# Patient Record
Sex: FEMALE | Race: WHITE | NOT HISPANIC OR LATINO | Employment: FULL TIME | URBAN - METROPOLITAN AREA
[De-identification: names, ages, dates, MRNs, and addresses within clinical notes are randomized per-mention and may not be internally consistent; named-entity substitution may affect disease eponyms.]

---

## 2017-01-24 ENCOUNTER — HOSPITAL ENCOUNTER (OUTPATIENT)
Dept: CARDIOLOGY | Facility: HOSPITAL | Age: 52
Discharge: HOME | End: 2017-01-24
Attending: INTERNAL MEDICINE | Admitting: INTERNAL MEDICINE

## 2017-08-02 ENCOUNTER — HOSPITAL ENCOUNTER (OUTPATIENT)
Dept: URGENT CARE | Facility: CLINIC | Age: 52
Setting detail: SPECIMEN
Discharge: HOME | End: 2017-08-02
Attending: FAMILY MEDICINE | Admitting: FAMILY MEDICINE

## 2017-08-02 LAB
AMPICILLIN SUSC ISLT: NORMAL
AZTREONAM SUSC ISLT: NORMAL
BACTERIA ISLT: NORMAL
BACTERIA SPEC AEROBE CULT: NORMAL
CEFAZOLIN SUSC ISLT: NORMAL
CEFEPIME SUSC ISLT: NORMAL
CEFTRIAXONE SUSC ISLT: NORMAL
CIPROFLOXACIN SUSC ISLT: NORMAL
COLONY COUNT: NORMAL
ERTAPENEM SUSC ISLT: NORMAL
LEVOFLOXACIN SUSC ISLT: NORMAL
Lab: NORMAL
MEROPENEM SUSC ISLT: NORMAL
MICRO REPORT STATUS: NORMAL
NITROFURANTOIN SUSC ISLT: NORMAL
PIP+TAZO SUSC ISLT: NORMAL
SPECIMEN SOURCE: NORMAL
SUSC METH SPEC: NORMAL
TETRACYCLINE SUSC ISLT: NORMAL
TOBRAMYCIN SUSC ISLT: NORMAL
TRIMETHOPRIM/SULFA: NORMAL

## 2017-09-25 ENCOUNTER — HOSPITAL ENCOUNTER (OUTPATIENT)
Dept: PHYSICAL THERAPY | Facility: HOSPITAL | Age: 52
Setting detail: RECURRING SERIES
Discharge: HOME | End: 2017-12-26
Attending: ORTHOPAEDIC SURGERY | Admitting: ORTHOPAEDIC SURGERY

## 2018-11-29 ENCOUNTER — HOSPITAL ENCOUNTER (OUTPATIENT)
Dept: OTHER | Facility: HOSPITAL | Age: 53
Setting detail: RECURRING SERIES
Discharge: HOME | End: 2018-12-31
Attending: PHYSICIAN ASSISTANT | Admitting: PHYSICIAN ASSISTANT

## 2019-01-04 ENCOUNTER — HOSPITAL ENCOUNTER (OUTPATIENT)
Dept: OTHER | Facility: HOSPITAL | Age: 54
Setting detail: RECURRING SERIES
Discharge: HOME | End: 2019-01-28
Attending: PHYSICIAN ASSISTANT | Admitting: PHYSICIAN ASSISTANT

## 2019-04-22 ENCOUNTER — HOSPITAL ENCOUNTER (OUTPATIENT)
Dept: URGENT CARE | Facility: CLINIC | Age: 54
Setting detail: SPECIMEN
Discharge: HOME | End: 2019-04-22
Attending: FAMILY MEDICINE | Admitting: FAMILY MEDICINE

## 2019-04-22 LAB
AMPICILLIN SUSC ISLT: NORMAL
AZTREONAM SUSC ISLT: NORMAL
BACTERIA ISLT: NORMAL
BACTERIA SPEC AEROBE CULT: NORMAL
CEFAZOLIN SUSC ISLT: NORMAL
CEFEPIME SUSC ISLT: NORMAL
CEFTRIAXONE SUSC ISLT: NORMAL
CIPROFLOXACIN SUSC ISLT: NORMAL
COLONY COUNT: NORMAL
LEVOFLOXACIN SUSC ISLT: NORMAL
Lab: NORMAL
MEROPENEM SUSC ISLT: NORMAL
MICRO REPORT STATUS: NORMAL
NITROFURANTOIN SUSC ISLT: NORMAL
PIP+TAZO SUSC ISLT: NORMAL
SPECIMEN SOURCE: NORMAL
SUSC METH SPEC: NORMAL
TETRACYCLINE SUSC ISLT: NORMAL
TOBRAMYCIN SUSC ISLT: NORMAL
TRIMETHOPRIM/SULFA: NORMAL

## 2021-11-06 ENCOUNTER — LAB (OUTPATIENT)
Dept: LAB | Facility: HOSPITAL | Age: 56
End: 2021-11-06

## 2021-11-06 ENCOUNTER — TRANSCRIBE ORDERS (OUTPATIENT)
Dept: ADMINISTRATIVE | Facility: HOSPITAL | Age: 56
End: 2021-11-06

## 2021-11-06 DIAGNOSIS — I51.9 MYXEDEMA HEART DISEASE: ICD-10-CM

## 2021-11-06 DIAGNOSIS — E78.2 MIXED HYPERLIPIDEMIA: ICD-10-CM

## 2021-11-06 DIAGNOSIS — I10 ESSENTIAL HYPERTENSION: ICD-10-CM

## 2021-11-06 DIAGNOSIS — E03.9 MYXEDEMA HEART DISEASE: ICD-10-CM

## 2021-11-06 DIAGNOSIS — I10 ESSENTIAL HYPERTENSION: Primary | ICD-10-CM

## 2021-11-06 LAB
25(OH)D3 SERPL-MCNC: 29.3 NG/ML (ref 30–100)
ALBUMIN SERPL-MCNC: 4.4 G/DL (ref 3.5–5.2)
ALBUMIN/GLOB SERPL: 1.6 G/DL
ALP SERPL-CCNC: 83 U/L (ref 39–117)
ALT SERPL W P-5'-P-CCNC: 29 U/L (ref 1–33)
ANION GAP SERPL CALCULATED.3IONS-SCNC: 9.8 MMOL/L (ref 5–15)
AST SERPL-CCNC: 21 U/L (ref 1–32)
BASOPHILS # BLD MANUAL: 0.11 10*3/MM3 (ref 0–0.2)
BASOPHILS NFR BLD AUTO: 1.1 % (ref 0–1.5)
BILIRUB SERPL-MCNC: 0.4 MG/DL (ref 0–1.2)
BUN SERPL-MCNC: 18 MG/DL (ref 6–20)
BUN/CREAT SERPL: 29 (ref 7–25)
CALCIUM SPEC-SCNC: 9.7 MG/DL (ref 8.6–10.5)
CHLORIDE SERPL-SCNC: 107 MMOL/L (ref 98–107)
CHOLEST SERPL-MCNC: 157 MG/DL (ref 0–200)
CO2 SERPL-SCNC: 27.2 MMOL/L (ref 22–29)
CREAT SERPL-MCNC: 0.62 MG/DL (ref 0.57–1)
DEPRECATED RDW RBC AUTO: 41.2 FL (ref 37–54)
ERYTHROCYTE [DISTWIDTH] IN BLOOD BY AUTOMATED COUNT: 12.7 % (ref 12.3–15.4)
GFR SERPL CREATININE-BSD FRML MDRD: 99 ML/MIN/1.73
GLOBULIN UR ELPH-MCNC: 2.8 GM/DL
GLUCOSE SERPL-MCNC: 85 MG/DL (ref 65–99)
HCT VFR BLD AUTO: 43.7 % (ref 34–46.6)
HDLC SERPL-MCNC: 47 MG/DL (ref 40–60)
HGB BLD-MCNC: 14.5 G/DL (ref 12–15.9)
LDLC SERPL CALC-MCNC: 90 MG/DL (ref 0–100)
LDLC/HDLC SERPL: 1.89 {RATIO}
LYMPHOCYTES # BLD MANUAL: 3.83 10*3/MM3 (ref 0.7–3.1)
LYMPHOCYTES NFR BLD MANUAL: 38.7 % (ref 19.6–45.3)
LYMPHOCYTES NFR BLD MANUAL: 8.6 % (ref 5–12)
MCH RBC QN AUTO: 29.5 PG (ref 26.6–33)
MCHC RBC AUTO-ENTMCNC: 33.2 G/DL (ref 31.5–35.7)
MCV RBC AUTO: 89 FL (ref 79–97)
MONOCYTES # BLD AUTO: 0.85 10*3/MM3 (ref 0.1–0.9)
NEUTROPHILS # BLD AUTO: 5.1 10*3/MM3 (ref 1.7–7)
NEUTROPHILS NFR BLD MANUAL: 51.6 % (ref 42.7–76)
PLAT MORPH BLD: NORMAL
PLATELET # BLD AUTO: 336 10*3/MM3 (ref 140–450)
PMV BLD AUTO: 10.5 FL (ref 6–12)
POTASSIUM SERPL-SCNC: 5.2 MMOL/L (ref 3.5–5.2)
PROT SERPL-MCNC: 7.2 G/DL (ref 6–8.5)
RBC # BLD AUTO: 4.91 10*6/MM3 (ref 3.77–5.28)
RBC MORPH BLD: NORMAL
SMUDGE CELLS BLD QL SMEAR: ABNORMAL
SODIUM SERPL-SCNC: 144 MMOL/L (ref 136–145)
TRIGL SERPL-MCNC: 107 MG/DL (ref 0–150)
TSH SERPL DL<=0.05 MIU/L-ACNC: 1.13 UIU/ML (ref 0.27–4.2)
VLDLC SERPL-MCNC: 20 MG/DL (ref 5–40)
WBC # BLD AUTO: 9.89 10*3/MM3 (ref 3.4–10.8)

## 2021-11-06 PROCEDURE — 84443 ASSAY THYROID STIM HORMONE: CPT

## 2021-11-06 PROCEDURE — 80053 COMPREHEN METABOLIC PANEL: CPT

## 2021-11-06 PROCEDURE — 82306 VITAMIN D 25 HYDROXY: CPT

## 2021-11-06 PROCEDURE — 36415 COLL VENOUS BLD VENIPUNCTURE: CPT

## 2021-11-06 PROCEDURE — 85025 COMPLETE CBC W/AUTO DIFF WBC: CPT

## 2021-11-06 PROCEDURE — 80061 LIPID PANEL: CPT

## 2021-11-06 PROCEDURE — 85007 BL SMEAR W/DIFF WBC COUNT: CPT

## 2021-12-17 ENCOUNTER — APPOINTMENT (OUTPATIENT)
Dept: GENERAL RADIOLOGY | Facility: HOSPITAL | Age: 56
End: 2021-12-17

## 2021-12-17 ENCOUNTER — HOSPITAL ENCOUNTER (OUTPATIENT)
Facility: HOSPITAL | Age: 56
Setting detail: OBSERVATION
Discharge: HOME | End: 2021-12-18
Attending: EMERGENCY MEDICINE | Admitting: INTERNAL MEDICINE

## 2021-12-17 DIAGNOSIS — R07.9 CHEST PAIN, UNSPECIFIED TYPE: Primary | ICD-10-CM

## 2021-12-17 DIAGNOSIS — I10 UNCONTROLLED HYPERTENSION: ICD-10-CM

## 2021-12-17 LAB
ALBUMIN SERPL-MCNC: 4.4 G/DL (ref 3.5–5.2)
ALBUMIN/GLOB SERPL: 1.6 G/DL
ALP SERPL-CCNC: 93 U/L (ref 39–117)
ALT SERPL W P-5'-P-CCNC: 29 U/L (ref 1–33)
ANION GAP SERPL CALCULATED.3IONS-SCNC: 11 MMOL/L (ref 5–15)
AST SERPL-CCNC: 17 U/L (ref 1–32)
BASOPHILS # BLD AUTO: 0.1 10*3/MM3 (ref 0–0.2)
BASOPHILS NFR BLD AUTO: 0.8 % (ref 0–1.5)
BILIRUB SERPL-MCNC: 0.2 MG/DL (ref 0–1.2)
BUN SERPL-MCNC: 20 MG/DL (ref 6–20)
BUN/CREAT SERPL: 29.9 (ref 7–25)
CALCIUM SPEC-SCNC: 9 MG/DL (ref 8.6–10.5)
CHLORIDE SERPL-SCNC: 101 MMOL/L (ref 98–107)
CO2 SERPL-SCNC: 26 MMOL/L (ref 22–29)
CREAT SERPL-MCNC: 0.67 MG/DL (ref 0.57–1)
DEPRECATED RDW RBC AUTO: 42.9 FL (ref 37–54)
EOSINOPHIL # BLD AUTO: 0.1 10*3/MM3 (ref 0–0.4)
EOSINOPHIL NFR BLD AUTO: 0.5 % (ref 0.3–6.2)
ERYTHROCYTE [DISTWIDTH] IN BLOOD BY AUTOMATED COUNT: 13.8 % (ref 12.3–15.4)
GFR SERPL CREATININE-BSD FRML MDRD: 90 ML/MIN/1.73
GLOBULIN UR ELPH-MCNC: 2.7 GM/DL
GLUCOSE SERPL-MCNC: 90 MG/DL (ref 65–99)
HCT VFR BLD AUTO: 42.7 % (ref 34–46.6)
HGB BLD-MCNC: 14.1 G/DL (ref 12–15.9)
LIPASE SERPL-CCNC: 36 U/L (ref 13–60)
LYMPHOCYTES # BLD AUTO: 4.8 10*3/MM3 (ref 0.7–3.1)
LYMPHOCYTES NFR BLD AUTO: 40.6 % (ref 19.6–45.3)
MAGNESIUM SERPL-MCNC: 2 MG/DL (ref 1.6–2.6)
MCH RBC QN AUTO: 29 PG (ref 26.6–33)
MCHC RBC AUTO-ENTMCNC: 33 G/DL (ref 31.5–35.7)
MCV RBC AUTO: 87.9 FL (ref 79–97)
MONOCYTES # BLD AUTO: 0.5 10*3/MM3 (ref 0.1–0.9)
MONOCYTES NFR BLD AUTO: 4.5 % (ref 5–12)
NEUTROPHILS NFR BLD AUTO: 53.6 % (ref 42.7–76)
NEUTROPHILS NFR BLD AUTO: 6.3 10*3/MM3 (ref 1.7–7)
NRBC BLD AUTO-RTO: 0.3 /100 WBC (ref 0–0.2)
PLATELET # BLD AUTO: 291 10*3/MM3 (ref 140–450)
PMV BLD AUTO: 8.2 FL (ref 6–12)
POTASSIUM SERPL-SCNC: 4.1 MMOL/L (ref 3.5–5.2)
PROT SERPL-MCNC: 7.1 G/DL (ref 6–8.5)
RBC # BLD AUTO: 4.86 10*6/MM3 (ref 3.77–5.28)
SARS-COV-2 RNA PNL SPEC NAA+PROBE: NOT DETECTED
SODIUM SERPL-SCNC: 138 MMOL/L (ref 136–145)
TROPONIN T SERPL-MCNC: <0.01 NG/ML (ref 0–0.03)
TROPONIN T SERPL-MCNC: <0.01 NG/ML (ref 0–0.03)
TSH SERPL DL<=0.05 MIU/L-ACNC: 0.64 UIU/ML (ref 0.27–4.2)
WBC NRBC COR # BLD: 11.8 10*3/MM3 (ref 3.4–10.8)
WHOLE BLOOD HOLD SPECIMEN: NORMAL

## 2021-12-17 PROCEDURE — 93005 ELECTROCARDIOGRAM TRACING: CPT

## 2021-12-17 PROCEDURE — C9803 HOPD COVID-19 SPEC COLLECT: HCPCS

## 2021-12-17 PROCEDURE — 93005 ELECTROCARDIOGRAM TRACING: CPT | Performed by: EMERGENCY MEDICINE

## 2021-12-17 PROCEDURE — 99284 EMERGENCY DEPT VISIT MOD MDM: CPT

## 2021-12-17 PROCEDURE — G0378 HOSPITAL OBSERVATION PER HR: HCPCS

## 2021-12-17 PROCEDURE — 85025 COMPLETE CBC W/AUTO DIFF WBC: CPT | Performed by: EMERGENCY MEDICINE

## 2021-12-17 PROCEDURE — 84443 ASSAY THYROID STIM HORMONE: CPT | Performed by: EMERGENCY MEDICINE

## 2021-12-17 PROCEDURE — 99204 OFFICE O/P NEW MOD 45 MIN: CPT | Performed by: INTERNAL MEDICINE

## 2021-12-17 PROCEDURE — 71045 X-RAY EXAM CHEST 1 VIEW: CPT

## 2021-12-17 PROCEDURE — U0003 INFECTIOUS AGENT DETECTION BY NUCLEIC ACID (DNA OR RNA); SEVERE ACUTE RESPIRATORY SYNDROME CORONAVIRUS 2 (SARS-COV-2) (CORONAVIRUS DISEASE [COVID-19]), AMPLIFIED PROBE TECHNIQUE, MAKING USE OF HIGH THROUGHPUT TECHNOLOGIES AS DESCRIBED BY CMS-2020-01-R: HCPCS | Performed by: EMERGENCY MEDICINE

## 2021-12-17 PROCEDURE — 83690 ASSAY OF LIPASE: CPT | Performed by: EMERGENCY MEDICINE

## 2021-12-17 PROCEDURE — 80053 COMPREHEN METABOLIC PANEL: CPT | Performed by: EMERGENCY MEDICINE

## 2021-12-17 PROCEDURE — 83735 ASSAY OF MAGNESIUM: CPT | Performed by: EMERGENCY MEDICINE

## 2021-12-17 PROCEDURE — 84484 ASSAY OF TROPONIN QUANT: CPT | Performed by: INTERNAL MEDICINE

## 2021-12-17 PROCEDURE — 84484 ASSAY OF TROPONIN QUANT: CPT | Performed by: EMERGENCY MEDICINE

## 2021-12-17 RX ORDER — ESCITALOPRAM OXALATE 10 MG/1
20 TABLET ORAL NIGHTLY
Status: DISCONTINUED | OUTPATIENT
Start: 2021-12-17 | End: 2021-12-18 | Stop reason: HOSPADM

## 2021-12-17 RX ORDER — LEVOTHYROXINE SODIUM 0.15 MG/1
150 TABLET ORAL
Status: DISCONTINUED | OUTPATIENT
Start: 2021-12-18 | End: 2021-12-18 | Stop reason: HOSPADM

## 2021-12-17 RX ORDER — SODIUM CHLORIDE 0.9 % (FLUSH) 0.9 %
3 SYRINGE (ML) INJECTION EVERY 12 HOURS SCHEDULED
Status: DISCONTINUED | OUTPATIENT
Start: 2021-12-17 | End: 2021-12-18 | Stop reason: HOSPADM

## 2021-12-17 RX ORDER — PANTOPRAZOLE SODIUM 40 MG/1
40 TABLET, DELAYED RELEASE ORAL NIGHTLY
Status: DISCONTINUED | OUTPATIENT
Start: 2021-12-17 | End: 2021-12-18 | Stop reason: HOSPADM

## 2021-12-17 RX ORDER — CHOLECALCIFEROL (VITAMIN D3) 125 MCG
5 CAPSULE ORAL NIGHTLY PRN
Status: DISCONTINUED | OUTPATIENT
Start: 2021-12-17 | End: 2021-12-18 | Stop reason: HOSPADM

## 2021-12-17 RX ORDER — ACETAMINOPHEN 160 MG/5ML
650 SOLUTION ORAL EVERY 4 HOURS PRN
Status: DISCONTINUED | OUTPATIENT
Start: 2021-12-17 | End: 2021-12-18 | Stop reason: HOSPADM

## 2021-12-17 RX ORDER — SODIUM CHLORIDE 0.9 % (FLUSH) 0.9 %
10 SYRINGE (ML) INJECTION AS NEEDED
Status: DISCONTINUED | OUTPATIENT
Start: 2021-12-17 | End: 2021-12-18 | Stop reason: HOSPADM

## 2021-12-17 RX ORDER — ASPIRIN 325 MG
325 TABLET ORAL ONCE
Status: COMPLETED | OUTPATIENT
Start: 2021-12-17 | End: 2021-12-17

## 2021-12-17 RX ORDER — NITROGLYCERIN 0.4 MG/1
0.4 TABLET SUBLINGUAL
Status: DISCONTINUED | OUTPATIENT
Start: 2021-12-17 | End: 2021-12-18 | Stop reason: HOSPADM

## 2021-12-17 RX ORDER — ZOLPIDEM TARTRATE 5 MG/1
5 TABLET ORAL NIGHTLY PRN
Status: DISCONTINUED | OUTPATIENT
Start: 2021-12-17 | End: 2021-12-18 | Stop reason: HOSPADM

## 2021-12-17 RX ORDER — ONDANSETRON 2 MG/ML
4 INJECTION INTRAMUSCULAR; INTRAVENOUS EVERY 6 HOURS PRN
Status: DISCONTINUED | OUTPATIENT
Start: 2021-12-17 | End: 2021-12-18 | Stop reason: HOSPADM

## 2021-12-17 RX ORDER — LOSARTAN POTASSIUM 25 MG/1
25 TABLET ORAL
Status: DISCONTINUED | OUTPATIENT
Start: 2021-12-18 | End: 2021-12-18 | Stop reason: HOSPADM

## 2021-12-17 RX ORDER — SODIUM CHLORIDE 0.9 % (FLUSH) 0.9 %
3-10 SYRINGE (ML) INJECTION AS NEEDED
Status: DISCONTINUED | OUTPATIENT
Start: 2021-12-17 | End: 2021-12-18 | Stop reason: HOSPADM

## 2021-12-17 RX ORDER — HYDRALAZINE HYDROCHLORIDE 20 MG/ML
10 INJECTION INTRAMUSCULAR; INTRAVENOUS EVERY 6 HOURS PRN
Status: DISCONTINUED | OUTPATIENT
Start: 2021-12-17 | End: 2021-12-18 | Stop reason: HOSPADM

## 2021-12-17 RX ADMIN — PANTOPRAZOLE SODIUM 40 MG: 40 TABLET, DELAYED RELEASE ORAL at 21:40

## 2021-12-17 RX ADMIN — ESCITALOPRAM OXALATE 20 MG: 10 TABLET ORAL at 21:40

## 2021-12-17 RX ADMIN — ZOLPIDEM TARTRATE 5 MG: 5 TABLET ORAL at 21:40

## 2021-12-17 RX ADMIN — ASPIRIN 325 MG ORAL TABLET 325 MG: 325 PILL ORAL at 13:00

## 2021-12-17 RX ADMIN — SODIUM CHLORIDE, PRESERVATIVE FREE 3 ML: 5 INJECTION INTRAVENOUS at 21:40

## 2021-12-17 NOTE — PLAN OF CARE
Problem: Adult Inpatient Plan of Care  Goal: Plan of Care Review  12/17/2021 1735 by Aziza Bolton, JOEL  Outcome: Ongoing, Progressing  Flowsheets (Taken 12/17/2021 1735)  Progress: no change  Plan of Care Reviewed With: patient  Outcome Summary: Pt admitted from ER with chest pain.  Resting in bed, V/S stable.  Myoview in the morning.  12/17/2021 1735 by Aziza Bolton, RN  Outcome: Ongoing, Progressing  Flowsheets (Taken 12/17/2021 1735)  Progress: no change  Plan of Care Reviewed With: patient  Outcome Summary: Pt admitted from ER with chest pain.  Resting in bed, V/S stable.  Myoview in the morning.  12/17/2021 1734 by Aziza Bolton, JOEL  Outcome: Ongoing, Progressing   Goal Outcome Evaluation:

## 2021-12-17 NOTE — ED PROVIDER NOTES
Subjective   History of Present Illness  Chest pain  56-year-old female describes a right-sided and substernal chest pain described as sharp in character variable intensity, off and on over the last 2 weeks with variable duration generally around 5 to 10 minutes.  She reports no relieving or exacerbating factors.  States it often comes on at rest.  She reports no cough or fever.  She has had nausea.  She has been lightheaded at times and near syncopal and describes increased fatigue.  Review of Systems   Constitutional: Positive for fatigue.   HENT: Negative.    Eyes: Negative.    Respiratory: Negative.    Cardiovascular: Positive for chest pain.   Gastrointestinal: Positive for nausea.   Genitourinary: Negative.    Musculoskeletal: Negative.    Skin: Negative.    Neurological: Positive for light-headedness.   Psychiatric/Behavioral: Negative.        Past Medical History:   Diagnosis Date   • Anxiety    • Disease of thyroid gland    • GERD (gastroesophageal reflux disease)    • Hyperlipidemia    • Hypertension    • Insomnia        Allergies   Allergen Reactions   • Nickel Hives   • Latex Nausea Only   • Ciprofloxacin Itching and Rash       Past Surgical History:   Procedure Laterality Date   • APPENDECTOMY     •  SECTION     • CYST REMOVAL     • FOOT ARTHROPLASTY     • HYSTERECTOMY         No family history on file.    Social History     Socioeconomic History   • Marital status:    Tobacco Use   • Smoking status: Never Smoker   • Smokeless tobacco: Never Used   Vaping Use   • Vaping Use: Never used     Prior to Admission medications    Medication Sig Start Date End Date Taking? Authorizing Provider   diclofenac (VOLTAREN) 75 MG EC tablet  21   Emergency, Nurse Anitha RN   diclofenac (VOLTAREN) 75 MG EC tablet diclofenac sodium 75 mg tablet,delayed release   TAKE ONE (1) TABLET BY MOUTH DAILY AS NEEDED    Emergency, Nurse Anitha RN   escitalopram (LEXAPRO) 20 MG tablet TAKE ONE (1) TABLET BY MOUTH  "EVERY DAY 10/25/21   Emergency, Nurse Epic, RN   eszopiclone (LUNESTA) 2 MG tablet TAKE ONE (1) TABLET BY MOUTH EVERY NIGHT AT BEDTIME 11/3/21   Emergency, Nurse JOEL Welsh   levothyroxine (SYNTHROID, LEVOTHROID) 150 MCG tablet levothyroxine 150 mcg tablet   TAKE ONE (1) TABLET BY MOUTH EVERY DAY    Emergency, Nurse JOEL Welsh   losartan (COZAAR) 25 MG tablet losartan 25 mg tablet   TAKE ONE (1) TABLET BY MOUTH EVERY DAY    Emergency, Nurse Epic, RN   Multiple Vitamin (multivitamin) capsule MULTIVITAMINS CAPS 8/21/16   Emergency, Nurse JOEL Welsh   pantoprazole (PROTONIX) 40 MG EC tablet TAKE ONE (1) TABLET BY MOUTH EVERY DAY 10/25/21   Emergency, Nurse JOEL Welsh   simvastatin (ZOCOR) 40 MG tablet simvastatin 40 mg tablet   TAKE ONE (1) TABLET BY MOUTH EVERY DAY 6/6/12   Emergency, Nurse Epic, RN   vitamin D3 125 MCG (5000 UT) capsule capsule Take 5,000 Units by mouth Daily.    Emergency, Nurse JOEL Wlesh     BP (!) 207/73   Pulse 67   Temp 98 °F (36.7 °C) (Temporal)   Resp 20   Ht 172.7 cm (68\")   Wt 105 kg (231 lb 7.7 oz)   SpO2 98%   BMI 35.20 kg/m²   I examined the patient using the appropriate personal protective equipment.          Objective   Physical Exam  General: Obese female, well-appearing, no acute distress, alert and appropriate  Eyes:  sclera nonicteric  HEENT: Mucous membranes moist, no mucosal swelling  Neck: Supple, no nuchal rigidity, no soft tissue swelling  Respirations: Respirations nonlabored, equal breath sounds bilaterally, clear lungs  Heart regular rate and rhythm, no murmurs rubs or gallops,   Abdomen soft nontender nondistended, no hepatosplenomegaly,  Extremities no clubbing cyanosis or edema, calves are symmetric and nontender  Neuro cranial nerves grossly intact, no focal limb deficits  Psych oriented, pleasant affect  Skin no rash, brisk cap refill  Procedures           ED Course      My EKG interpretation sinus rhythm, rate of 75, delayed R wave progression in the anterior leads   "   Results for orders placed or performed during the hospital encounter of 12/17/21   COVID-19,CEPHEID/CHARAN,COR/RYAN/PAD/POPPY IN-HOUSE(OR EMERGENT/ADD-ON),NP SWAB IN TRANSPORT MEDIA 3-4 HR TAT, RT-PCR - Swab, Nasopharynx    Specimen: Nasopharynx; Swab   Result Value Ref Range    COVID19 Not Detected Not Detected - Ref. Range   Comprehensive Metabolic Panel    Specimen: Blood   Result Value Ref Range    Glucose 90 65 - 99 mg/dL    BUN 20 6 - 20 mg/dL    Creatinine 0.67 0.57 - 1.00 mg/dL    Sodium 138 136 - 145 mmol/L    Potassium 4.1 3.5 - 5.2 mmol/L    Chloride 101 98 - 107 mmol/L    CO2 26.0 22.0 - 29.0 mmol/L    Calcium 9.0 8.6 - 10.5 mg/dL    Total Protein 7.1 6.0 - 8.5 g/dL    Albumin 4.40 3.50 - 5.20 g/dL    ALT (SGPT) 29 1 - 33 U/L    AST (SGOT) 17 1 - 32 U/L    Alkaline Phosphatase 93 39 - 117 U/L    Total Bilirubin 0.2 0.0 - 1.2 mg/dL    eGFR Non African Amer 90 >60 mL/min/1.73    Globulin 2.7 gm/dL    A/G Ratio 1.6 g/dL    BUN/Creatinine Ratio 29.9 (H) 7.0 - 25.0    Anion Gap 11.0 5.0 - 15.0 mmol/L   Lipase    Specimen: Blood   Result Value Ref Range    Lipase 36 13 - 60 U/L   Troponin    Specimen: Blood   Result Value Ref Range    Troponin T <0.010 0.000 - 0.030 ng/mL   TSH    Specimen: Blood   Result Value Ref Range    TSH 0.644 0.270 - 4.200 uIU/mL   Magnesium    Specimen: Blood   Result Value Ref Range    Magnesium 2.0 1.6 - 2.6 mg/dL   CBC Auto Differential    Specimen: Blood   Result Value Ref Range    WBC 11.80 (H) 3.40 - 10.80 10*3/mm3    RBC 4.86 3.77 - 5.28 10*6/mm3    Hemoglobin 14.1 12.0 - 15.9 g/dL    Hematocrit 42.7 34.0 - 46.6 %    MCV 87.9 79.0 - 97.0 fL    MCH 29.0 26.6 - 33.0 pg    MCHC 33.0 31.5 - 35.7 g/dL    RDW 13.8 12.3 - 15.4 %    RDW-SD 42.9 37.0 - 54.0 fl    MPV 8.2 6.0 - 12.0 fL    Platelets 291 140 - 450 10*3/mm3    Neutrophil % 53.6 42.7 - 76.0 %    Lymphocyte % 40.6 19.6 - 45.3 %    Monocyte % 4.5 (L) 5.0 - 12.0 %    Eosinophil % 0.5 0.3 - 6.2 %    Basophil % 0.8 0.0 - 1.5 %     Neutrophils, Absolute 6.30 1.70 - 7.00 10*3/mm3    Lymphocytes, Absolute 4.80 (H) 0.70 - 3.10 10*3/mm3    Monocytes, Absolute 0.50 0.10 - 0.90 10*3/mm3    Eosinophils, Absolute 0.10 0.00 - 0.40 10*3/mm3    Basophils, Absolute 0.10 0.00 - 0.20 10*3/mm3    nRBC 0.3 (H) 0.0 - 0.2 /100 WBC   ECG 12 Lead   Result Value Ref Range    QT Interval 373 ms     XR Chest 1 View    Result Date: 12/17/2021  No acute chest finding.  Electronically Signed By-Karol Woodson MD On:12/17/2021 1:04 PM This report was finalized on [id number] by  Karol Woodson MD.                                          MDM  Patient presents with atypical pain right chest intermittent over the last couple of weeks.  Her EKG shows no acute ischemic change and her initial troponin is normal.  She is not describing symptoms of DVT or dissection or pneumonia.  She did have some elevated blood pressures initially that were improved.  She was ordered aspirin and nitroglycerin.  She was stable on the monitor and resting comfortably on reexamination.  Patient does have moderate risk heart score and 3 seconds was paged for admission for further chest pain evaluation.  Final diagnoses:   Chest pain, unspecified type   Uncontrolled hypertension       ED Disposition  ED Disposition     ED Disposition Condition Comment    Decision to Admit  Level of Care: Telemetry [5]   Admitting Physician: SD RAND [[id number]]            No follow-up provider specified.       Medication List      No changes were made to your prescriptions during this visit.          Antoine Segura MD  12/17/21 5529

## 2021-12-17 NOTE — CONSULTS
"  Referring Provider: Carlos Jordan MD  Reason for Consultation:  Chest pain    Patient Care Team:  Monika Griffin APRN as PCP - General    Chief complaint  Chest pain    Subjective .     History of present illness:  Tatyana Moseley is a 56 y.o. 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  Patient has been having these symptoms off and on for last couple of weeks.  Patient has significant fatigue.  Not necessarily exertional.  Not related to any specific food or eating.  Denies having any fever cough chills nausea vomiting.  Patient had \"broken heart syndrome several years ago and did not have any significant obstructive disease at that time.  Patient does not smoke.  Patient came to the emergency room.  EKG showed no acute changes.  Troponin levels are negative.  Cardiology consultation was requested.     ROS      Patient is not having any shortness of breath, palpitations, dizziness or syncope.  Denies having any headache ,abdominal pain ,nausea, vomiting , diarrhea constipation, loss of weight or loss of appetite.  Denies having any excessive bruising ,hematuria or blood in the stool.    Review of all systems negative except as indicated      History  Past Medical History:   Diagnosis Date   • Anxiety    • Disease of thyroid gland    • GERD (gastroesophageal reflux disease)    • Hyperlipidemia    • Hypertension    • Insomnia        Past Surgical History:   Procedure Laterality Date   • APPENDECTOMY     •  SECTION     • CYST REMOVAL     • FOOT ARTHROPLASTY     • HYSTERECTOMY         No family history on file.    Social History     Tobacco Use   • Smoking status: Never Smoker   • Smokeless tobacco: Never Used   Vaping Use   • Vaping Use: Never used   Substance Use Topics   • Alcohol use: Not on file     Comment: rarely   • Drug use: Not on file      " "  Medications Prior to Admission   Medication Sig Dispense Refill Last Dose   • diclofenac (VOLTAREN) 75 MG EC tablet       • diclofenac (VOLTAREN) 75 MG EC tablet diclofenac sodium 75 mg tablet,delayed release   TAKE ONE (1) TABLET BY MOUTH DAILY AS NEEDED      • escitalopram (LEXAPRO) 20 MG tablet TAKE ONE (1) TABLET BY MOUTH EVERY DAY      • eszopiclone (LUNESTA) 2 MG tablet TAKE ONE (1) TABLET BY MOUTH EVERY NIGHT AT BEDTIME      • levothyroxine (SYNTHROID, LEVOTHROID) 150 MCG tablet levothyroxine 150 mcg tablet   TAKE ONE (1) TABLET BY MOUTH EVERY DAY      • losartan (COZAAR) 25 MG tablet losartan 25 mg tablet   TAKE ONE (1) TABLET BY MOUTH EVERY DAY      • Multiple Vitamin (multivitamin) capsule MULTIVITAMINS CAPS      • pantoprazole (PROTONIX) 40 MG EC tablet TAKE ONE (1) TABLET BY MOUTH EVERY DAY      • simvastatin (ZOCOR) 40 MG tablet simvastatin 40 mg tablet   TAKE ONE (1) TABLET BY MOUTH EVERY DAY      • vitamin D3 125 MCG (5000 UT) capsule capsule Take 5,000 Units by mouth Daily.            Nickel, Latex, and Ciprofloxacin    Scheduled Meds:escitalopram, 20 mg, Oral, Daily  [START ON 12/18/2021] levothyroxine, 150 mcg, Oral, Q AM  [START ON 12/18/2021] losartan, 25 mg, Oral, Q24H  [START ON 12/18/2021] pantoprazole, 40 mg, Oral, Q AM  sodium chloride, 3 mL, Intravenous, Q12H      Continuous Infusions:   PRN Meds:.•  acetaminophen  •  hydrALAZINE  •  melatonin  •  nitroglycerin  •  ondansetron  •  [COMPLETED] Insert peripheral IV **AND** sodium chloride  •  sodium chloride  •  zolpidem    Objective     VITAL SIGNS  Vitals:    12/17/21 1214 12/17/21 1215 12/17/21 1404 12/17/21 1513   BP:  (!) 207/73 151/80 148/76   BP Location:    Right arm   Patient Position:    Lying   Pulse: 67  64 57   Resp: 20 18 18   Temp: 98 °F (36.7 °C)      TempSrc: Temporal      SpO2: 98%  98% 92%   Weight: 105 kg (231 lb 7.7 oz)      Height: 172.7 cm (68\")          Flowsheet Rows      First Filed Value   Admission Height 172.7 " "cm (68\") Documented at 12/17/2021 1214   Admission Weight 105 kg (231 lb 7.7 oz) Documented at 12/17/2021 1214          No intake or output data in the 24 hours ending 12/17/21 1714     TELEMETRY: Sinus rhythm    Physical Exam:  The patient is alert, oriented and in no distress.  Vital signs as noted above.  Exogenous obesity (BMI 35)  Head and neck revealed no carotid bruits or jugular venous distention.  No thyromegaly or lymph adenopathy is present  Lungs clear.  No wheezing.  Breath sounds are normal bilaterally.  Heart normal first and second heart sounds.No murmur.  No precordial rub is present.  No gallop is present.  Abdomen soft and nontender.  No organomegaly is present.  Extremities with good peripheral pulses without any pedal edema.  Skin warm and dry.  Musculoskeletal system is grossly normal  CNS grossly normal      Results Review:   I reviewed the patient's new clinical results.  Lab Results (last 24 hours)     Procedure Component Value Units Date/Time    Troponin [[id number]]  (Normal) Collected: 12/17/21 1249    Specimen: Blood Updated: 12/17/21 1612     Troponin T <0.010 ng/mL     Narrative:      Troponin T Reference Range:  <= 0.03 ng/mL-   Negative for AMI  >0.03 ng/mL-     Abnormal for myocardial necrosis.  Clinicians would have to utilize clinical acumen, EKG, Troponin and serial changes to determine if it is an Acute Myocardial Infarction or myocardial injury due to an underlying chronic condition.       Results may be falsely decreased if patient taking Biotin.      Extra Tubes [[id number]] Collected: 12/17/21 1249    Specimen: Blood Updated: 12/17/21 1400    Narrative:      The following orders were created for panel order Extra Tubes.  Procedure                               Abnormality         Status                     ---------                               -----------         ------                     Gold Top - [facility][[id number]]                                   Final result             "   Light Blue Top[[id number]]                                   Final result                 Please view results for these tests on the individual orders.    Light Blue Top [[id number]] Collected: 12/17/21 1249    Specimen: Blood Updated: 12/17/21 1400     Extra Tube hold for add-on     Comment: Auto resulted       COVID PRE-OP / PRE-PROCEDURE SCREENING ORDER (NO ISOLATION) - Swab, Nasopharynx [[id number]]  (Normal) Collected: 12/17/21 1249    Specimen: Swab from Nasopharynx Updated: 12/17/21 1331    Narrative:      The following orders were created for panel order COVID PRE-OP / PRE-PROCEDURE SCREENING ORDER (NO ISOLATION) - Swab, Nasopharynx.  Procedure                               Abnormality         Status                     ---------                               -----------         ------                     COVID-19,CEPHEID/CHARAN,CO...[[id number]]  Normal              Final result                 Please view results for these tests on the individual orders.    COVID-19,CEPHEID/CHARAN,COR/RYAN/PAD/POPPY IN-HOUSE(OR EMERGENT/ADD-ON),NP SWAB IN TRANSPORT MEDIA 3-4 HR TAT, RT-PCR - Swab, Nasopharynx [[id number]]  (Normal) Collected: 12/17/21 1249    Specimen: Swab from Nasopharynx Updated: 12/17/21 1331     COVID19 Not Detected    Narrative:      Fact sheet for providers: https://www.fda.gov/media/475135/download     Fact sheet for patients: https://www.fda.gov/media/556790/download  Fact sheet for providers: https://www.fda.gov/media/303491/download     Fact sheet for patients: https://www.fda.gov/media/857734/download    TSH [[id number]]  (Normal) Collected: 12/17/21 1249    Specimen: Blood Updated: 12/17/21 1331     TSH 0.644 uIU/mL     Troponin [[id number]]  (Normal) Collected: 12/17/21 1249    Specimen: Blood Updated: 12/17/21 1331     Troponin T <0.010 ng/mL     Narrative:      Troponin T Reference Range:  <= 0.03 ng/mL-   Negative for AMI  >0.03 ng/mL-     Abnormal for myocardial necrosis.  Clinicians would have to  utilize clinical acumen, EKG, Troponin and serial changes to determine if it is an Acute Myocardial Infarction or myocardial injury due to an underlying chronic condition.       Results may be falsely decreased if patient taking Biotin.      Comprehensive Metabolic Panel [[id number]]  (Abnormal) Collected: 12/17/21 1249    Specimen: Blood Updated: 12/17/21 1327     Glucose 90 mg/dL      BUN 20 mg/dL      Creatinine 0.67 mg/dL      Sodium 138 mmol/L      Potassium 4.1 mmol/L      Chloride 101 mmol/L      CO2 26.0 mmol/L      Calcium 9.0 mg/dL      Total Protein 7.1 g/dL      Albumin 4.40 g/dL      ALT (SGPT) 29 U/L      AST (SGOT) 17 U/L      Alkaline Phosphatase 93 U/L      Total Bilirubin 0.2 mg/dL      eGFR Non African Amer 90 mL/min/1.73      Globulin 2.7 gm/dL      A/G Ratio 1.6 g/dL      BUN/Creatinine Ratio 29.9     Anion Gap 11.0 mmol/L     Narrative:      GFR Normal >60  Chronic Kidney Disease <60  Kidney Failure <15      Lipase [[id number]]  (Normal) Collected: 12/17/21 1249    Specimen: Blood Updated: 12/17/21 1327     Lipase 36 U/L     Magnesium [[id number]]  (Normal) Collected: 12/17/21 1249    Specimen: Blood Updated: 12/17/21 1327     Magnesium 2.0 mg/dL     Gold Top - SST [[id number]] Collected: 12/17/21 1249    Specimen: Blood Updated: 12/17/21 1313    CBC & Differential [[id number]]  (Abnormal) Collected: 12/17/21 1249    Specimen: Blood Updated: 12/17/21 1259    Narrative:      The following orders were created for panel order CBC & Differential.  Procedure                               Abnormality         Status                     ---------                               -----------         ------                     CBC Auto Differential[[id number]]        Abnormal            Final result                 Please view results for these tests on the individual orders.    CBC Auto Differential [[id number]]  (Abnormal) Collected: 12/17/21 1249    Specimen: Blood Updated: 12/17/21 1259     WBC 11.80 10*3/mm3       RBC 4.86 10*6/mm3      Hemoglobin 14.1 g/dL      Hematocrit 42.7 %      MCV 87.9 fL      MCH 29.0 pg      MCHC 33.0 g/dL      RDW 13.8 %      RDW-SD 42.9 fl      MPV 8.2 fL      Platelets 291 10*3/mm3      Neutrophil % 53.6 %      Lymphocyte % 40.6 %      Monocyte % 4.5 %      Eosinophil % 0.5 %      Basophil % 0.8 %      Neutrophils, Absolute 6.30 10*3/mm3      Lymphocytes, Absolute 4.80 10*3/mm3      Monocytes, Absolute 0.50 10*3/mm3      Eosinophils, Absolute 0.10 10*3/mm3      Basophils, Absolute 0.10 10*3/mm3      nRBC 0.3 /100 WBC           Imaging Results (Last 24 Hours)     Procedure Component Value Units Date/Time    XR Chest 1 View [[id number]] Collected: 12/17/21 1304     Updated: 12/17/21 1306    Narrative:      DATE OF EXAM:  12/17/2021 12:58 PM     PROCEDURE:  XR CHEST 1 VW-     INDICATIONS:  Chest pain.       COMPARISON:  None     TECHNIQUE:   Single radiographic view of the chest was obtained.     FINDINGS:  Clear lungs. Benign calcified granuloma in the periphery of the right  midlung. Normal heart size. No pleural effusion. No pneumothorax. No  acute osseous abnormality.       Impression:      No acute chest finding.     Electronically Signed By-Karol Woodson MD On:12/17/2021 1:04 PM  This report was finalized on [id number] by  Karol Woodson MD.      LAB RESULTS (LAST 7 DAYS)    CBC  Results from last 7 days   Lab Units 12/17/21  1249   WBC 10*3/mm3 11.80*   RBC 10*6/mm3 4.86   HEMOGLOBIN g/dL 14.1   HEMATOCRIT % 42.7   MCV fL 87.9   PLATELETS 10*3/mm3 291       BMP  Results from last 7 days   Lab Units 12/17/21  1249   SODIUM mmol/L 138   POTASSIUM mmol/L 4.1   CHLORIDE mmol/L 101   CO2 mmol/L 26.0   BUN mg/dL 20   CREATININE mg/dL 0.67   GLUCOSE mg/dL 90   MAGNESIUM mg/dL 2.0       CMP   Results from last 7 days   Lab Units 12/17/21  1249   SODIUM mmol/L 138   POTASSIUM mmol/L 4.1   CHLORIDE mmol/L 101   CO2 mmol/L 26.0   BUN mg/dL 20   CREATININE mg/dL 0.67   GLUCOSE mg/dL 90   ALBUMIN  g/dL 4.40   BILIRUBIN mg/dL 0.2   ALK PHOS U/L 93   AST (SGOT) U/L 17   ALT (SGPT) U/L 29   LIPASE U/L 36         BNP        TROPONIN  Results from last 7 days   Lab Units 21  1249   TROPONIN T ng/mL <0.010  <0.010       CoAg        Creatinine Clearance  Estimated Creatinine Clearance: 116 mL/min (by C-G formula based on SCr of 0.67 mg/dL).    ABG        Radiology  XR Chest 1 View    Result Date: 2021  No acute chest finding.  Electronically Signed By-Karol Woodson MD On:2021 1:04 PM This report was finalized on [id number] by  Karol Woodson MD.              EKG          I personally viewed and interpreted the patient's EKG/Telemetry data: Sinus rhythm without any ischemic change    ECHOCARDIOGRAM:                Cardiolite (Tc-99m Sestamibi) stress test      OTHER:     Assessment/Plan     Active Problems:    Chest pain  [[[[[[[[[[[[[[[[[[[[[[[[[  Impression  ==============  -Chest discomfort-possible angina pectoris although somewhat atypical.    Cristy scan Cardiolite test showed mild apical thinning without ischemia.  2017     -history of mild elevation of troponin suggestive of possible subendocardial myocardial infarction 2006. Patient had normal left ventricular function and normal coronary arteries .     -hypothyroidism and dyslipidemia.  Hypertension     -status post appendectomy thyroid biopsy  hysterectomy and lumpectomy.  Hip surgery     -family history of coronary artery disease.    -Non-smoker     -allergic to nickel and Cipro.  Latex.  ============  Plan  =============  Chest discomfort-possible angina pectoris although somewhat atypical.  EKG showed no acute changes.  Troponin levels are negative.  Stress Cardiolite test  Echocardiogram  Further plan will depend on patient's progress.  [[[[[[[[[[[[[[[[[[[[[[[                Angela Malcolm MD  21  17:14 EST

## 2021-12-17 NOTE — H&P
"    Patient Care Team:  Monika Griffin APRN as PCP - General    Chief complaint Chest pain    Subjective     Patient is a 56 y.o. female who presents with complaint of substernal chest pain radiating to right neck and shoulder today at work.  She has had similar but less severe symptoms off and on for 2 weeks and has had significant fatigue.  Symptoms do not appear related to exertion.  They are not related to any specific food that she is eating.  She has not had any nausea or vomiting.  She is compliant with her prescribed antacid.  She relates that she had a \"heart attack\" 20 years ago with similar symptoms where her troponins elevated but she had a normal heart cath.  She was told at that time she had \"broken heart syndrome\"..  She does not smoke.  She has multiple family members with \"heart disease\" but is unsure if they have coronary artery disease.  After arrival in the ER she had markedly elevated blood pressure, greater than 200/100 and had a near syncopal episode in triage.  Her blood pressure is now normal and she feels much better.    Review of Systems   Constitutional: Positive for activity change. Negative for appetite change, chills, fatigue and fever.   HENT: Negative for drooling and mouth sores.    Eyes: Negative for visual disturbance.   Respiratory: Negative for cough, shortness of breath, wheezing and stridor.    Cardiovascular: Positive for chest pain. Negative for palpitations and leg swelling.   Gastrointestinal: Negative for abdominal pain, constipation, diarrhea, nausea and vomiting.   Endocrine: Negative for polyuria.   Genitourinary: Negative for dysuria, hematuria and urgency.   Musculoskeletal: Negative for arthralgias and back pain.   Skin: Negative for rash and wound.   Allergic/Immunologic: Negative for immunocompromised state.   Neurological: Negative for tremors, weakness and headaches.   Psychiatric/Behavioral: Negative for confusion.          History  Past Medical History: "   Diagnosis Date   • Anxiety    • Disease of thyroid gland    • GERD (gastroesophageal reflux disease)    • Hyperlipidemia    • Hypertension    • Insomnia      Past Surgical History:   Procedure Laterality Date   • APPENDECTOMY     •  SECTION     • CYST REMOVAL     • FOOT ARTHROPLASTY     • HYSTERECTOMY       No family history on file.  Social History     Tobacco Use   • Smoking status: Never Smoker   • Smokeless tobacco: Never Used   Vaping Use   • Vaping Use: Never used   Substance Use Topics   • Alcohol use: Not on file     Comment: rarely   • Drug use: Not on file     (Not in a hospital admission)    Allergies:  Nickel, Latex, and Ciprofloxacin    Objective     Vital Signs  Temp:  [98 °F (36.7 °C)] 98 °F (36.7 °C)  Heart Rate:  [57-67] 57  Resp:  [18-20] 18  BP: (123-207)/(73-82) 148/76     Physical Exam:      General Appearance:    Alert, cooperative, in no acute distress   Head:    Normocephalic, without obvious abnormality, atraumatic   Eyes:            Lids and lashes normal, conjunctivae and sclerae normal, no   icterus, no pallor, corneas clear, PERRLA   Ears:    Ears appear intact with no abnormalities noted   Throat:   No oral lesions, no thrush, oral mucosa moist   Neck:   No adenopathy, supple, trachea midline, no thyromegaly, no   carotid bruit, no JVD   Lungs:     Clear to auscultation,respirations regular, even and                  unlabored    Heart:    Regular rhythm and normal rate, normal S1 and S2, no            murmur, no gallop, no rub, no click   Chest Wall:    No abnormalities observed   Abdomen:     Normal bowel sounds, no masses, no organomegaly, soft        non-tender, non-distended, no guarding, no rebound                tenderness   Extremities:   Moves all extremities well, no edema, no cyanosis, no             redness   Pulses:   Pulses palpable and equal bilaterally   Skin:   No bleeding, bruising or rash   Lymph nodes:   No palpable adenopathy   Neurologic:   Cranial nerves  2 - 12 grossly intact, sensation intact, DTR       present and equal bilaterally       Results Review:     Imaging Results (Last 24 Hours)     Procedure Component Value Units Date/Time    XR Chest 1 View [[id number]] Collected: 12/17/21 1304     Updated: 12/17/21 1306    Narrative:      DATE OF EXAM:  12/17/2021 12:58 PM     PROCEDURE:  XR CHEST 1 VW-     INDICATIONS:  Chest pain.       COMPARISON:  None     TECHNIQUE:   Single radiographic view of the chest was obtained.     FINDINGS:  Clear lungs. Benign calcified granuloma in the periphery of the right  midlung. Normal heart size. No pleural effusion. No pneumothorax. No  acute osseous abnormality.       Impression:      No acute chest finding.     Electronically Signed By-Karol Woodson MD On:12/17/2021 1:04 PM  This report was finalized on [id number] by  Karol Woodson MD.           Lab Results (last 24 hours)     Procedure Component Value Units Date/Time    Troponin [[id number]]  (Normal) Collected: 12/17/21 1249    Specimen: Blood Updated: 12/17/21 1612     Troponin T <0.010 ng/mL     Narrative:      Troponin T Reference Range:  <= 0.03 ng/mL-   Negative for AMI  >0.03 ng/mL-     Abnormal for myocardial necrosis.  Clinicians would have to utilize clinical acumen, EKG, Troponin and serial changes to determine if it is an Acute Myocardial Infarction or myocardial injury due to an underlying chronic condition.       Results may be falsely decreased if patient taking Biotin.      Extra Tubes [[id number]] Collected: 12/17/21 1249    Specimen: Blood Updated: 12/17/21 1400    Narrative:      The following orders were created for panel order Extra Tubes.  Procedure                               Abnormality         Status                     ---------                               -----------         ------                     Gold Top - SST[[id number]]                                   Final result               Light Blue Top[[id number]]                                    Final result                 Please view results for these tests on the individual orders.    Light Blue Top [[id number]] Collected: 12/17/21 1249    Specimen: Blood Updated: 12/17/21 1400     Extra Tube hold for add-on     Comment: Auto resulted       COVID PRE-OP / PRE-PROCEDURE SCREENING ORDER (NO ISOLATION) - Swab, Nasopharynx [[id number]]  (Normal) Collected: 12/17/21 1249    Specimen: Swab from Nasopharynx Updated: 12/17/21 1331    Narrative:      The following orders were created for panel order COVID PRE-OP / PRE-PROCEDURE SCREENING ORDER (NO ISOLATION) - Swab, Nasopharynx.  Procedure                               Abnormality         Status                     ---------                               -----------         ------                     COVID-19,CEPHEID/CHARAN,CO...[[id number]]  Normal              Final result                 Please view results for these tests on the individual orders.    COVID-19,CEPHEID/CHARAN,COR/RYAN/PAD/POPPY IN-HOUSE(OR EMERGENT/ADD-ON),NP SWAB IN TRANSPORT MEDIA 3-4 HR TAT, RT-PCR - Swab, Nasopharynx [[id number]]  (Normal) Collected: 12/17/21 1249    Specimen: Swab from Nasopharynx Updated: 12/17/21 1331     COVID19 Not Detected    Narrative:      Fact sheet for providers: https://www.fda.gov/media/662143/download     Fact sheet for patients: https://www.fda.gov/media/994869/download  Fact sheet for providers: https://www.fda.gov/media/626152/download     Fact sheet for patients: https://www.fda.gov/media/138308/download    TSH [[id number]]  (Normal) Collected: 12/17/21 1249    Specimen: Blood Updated: 12/17/21 1331     TSH 0.644 uIU/mL     Troponin [[id number]]  (Normal) Collected: 12/17/21 1249    Specimen: Blood Updated: 12/17/21 1331     Troponin T <0.010 ng/mL     Narrative:      Troponin T Reference Range:  <= 0.03 ng/mL-   Negative for AMI  >0.03 ng/mL-     Abnormal for myocardial necrosis.  Clinicians would have to utilize clinical acumen, EKG, Troponin and serial changes to  determine if it is an Acute Myocardial Infarction or myocardial injury due to an underlying chronic condition.       Results may be falsely decreased if patient taking Biotin.      Comprehensive Metabolic Panel [[id number]]  (Abnormal) Collected: 12/17/21 1249    Specimen: Blood Updated: 12/17/21 1327     Glucose 90 mg/dL      BUN 20 mg/dL      Creatinine 0.67 mg/dL      Sodium 138 mmol/L      Potassium 4.1 mmol/L      Chloride 101 mmol/L      CO2 26.0 mmol/L      Calcium 9.0 mg/dL      Total Protein 7.1 g/dL      Albumin 4.40 g/dL      ALT (SGPT) 29 U/L      AST (SGOT) 17 U/L      Alkaline Phosphatase 93 U/L      Total Bilirubin 0.2 mg/dL      eGFR Non African Amer 90 mL/min/1.73      Globulin 2.7 gm/dL      A/G Ratio 1.6 g/dL      BUN/Creatinine Ratio 29.9     Anion Gap 11.0 mmol/L     Narrative:      GFR Normal >60  Chronic Kidney Disease <60  Kidney Failure <15      Lipase [[id number]]  (Normal) Collected: 12/17/21 1249    Specimen: Blood Updated: 12/17/21 1327     Lipase 36 U/L     Magnesium [[id number]]  (Normal) Collected: 12/17/21 1249    Specimen: Blood Updated: 12/17/21 1327     Magnesium 2.0 mg/dL     Gold Top - SST [[id number]] Collected: 12/17/21 1249    Specimen: Blood Updated: 12/17/21 1313    CBC & Differential [[id number]]  (Abnormal) Collected: 12/17/21 1249    Specimen: Blood Updated: 12/17/21 1259    Narrative:      The following orders were created for panel order CBC & Differential.  Procedure                               Abnormality         Status                     ---------                               -----------         ------                     CBC Auto Differential[[id number]]        Abnormal            Final result                 Please view results for these tests on the individual orders.    CBC Auto Differential [[id number]]  (Abnormal) Collected: 12/17/21 1249    Specimen: Blood Updated: 12/17/21 1259     WBC 11.80 10*3/mm3      RBC 4.86 10*6/mm3      Hemoglobin 14.1 g/dL       Hematocrit 42.7 %      MCV 87.9 fL      MCH 29.0 pg      MCHC 33.0 g/dL      RDW 13.8 %      RDW-SD 42.9 fl      MPV 8.2 fL      Platelets 291 10*3/mm3      Neutrophil % 53.6 %      Lymphocyte % 40.6 %      Monocyte % 4.5 %      Eosinophil % 0.5 %      Basophil % 0.8 %      Neutrophils, Absolute 6.30 10*3/mm3      Lymphocytes, Absolute 4.80 10*3/mm3      Monocytes, Absolute 0.50 10*3/mm3      Eosinophils, Absolute 0.10 10*3/mm3      Basophils, Absolute 0.10 10*3/mm3      nRBC 0.3 /100 WBC            I reviewed the patient's new clinical results.    Assessment/Plan       Chest pain  -Symptoms are concerning for angina in a patient with a history of possible Takotsubo syndrome.  Initial troponin is normal with no acute ischemic changes on EKG.  Will repeat troponins, obtain echo and schedule stress test in morning.  History of myocardial infarction  Hypertensive crisis - continue home medications and use as needed hydralazine; avoiding beta-blockers due to to stress test in the morning  Mood disorder -Home medications  Hypothyroidism -TSH is normal; continue levothyroxine  Gastroesophageal reflux disease -continue PPI        I discussed the patient's findings and my recommendations with patient.     Lilian Haider MD  12/17/21  16:12 EST

## 2021-12-18 ENCOUNTER — APPOINTMENT (OUTPATIENT)
Dept: NUCLEAR MEDICINE | Facility: HOSPITAL | Age: 56
End: 2021-12-18

## 2021-12-18 ENCOUNTER — APPOINTMENT (OUTPATIENT)
Dept: ULTRASOUND IMAGING | Facility: HOSPITAL | Age: 56
End: 2021-12-18

## 2021-12-18 ENCOUNTER — APPOINTMENT (OUTPATIENT)
Dept: CARDIOLOGY | Facility: HOSPITAL | Age: 56
End: 2021-12-18

## 2021-12-18 VITALS
OXYGEN SATURATION: 95 % | DIASTOLIC BLOOD PRESSURE: 82 MMHG | TEMPERATURE: 97.4 F | HEART RATE: 64 BPM | WEIGHT: 231 LBS | HEIGHT: 68 IN | SYSTOLIC BLOOD PRESSURE: 143 MMHG | BODY MASS INDEX: 35.01 KG/M2 | RESPIRATION RATE: 18 BRPM

## 2021-12-18 PROBLEM — I10 UNCONTROLLED HYPERTENSION: Status: ACTIVE | Noted: 2021-12-18

## 2021-12-18 PROBLEM — K21.9 GERD WITHOUT ESOPHAGITIS: Status: ACTIVE | Noted: 2021-12-18

## 2021-12-18 LAB
BH CV ECHO MEAS - ACS: 1.9 CM
BH CV ECHO MEAS - AO MAX PG (FULL): 0.64 MMHG
BH CV ECHO MEAS - AO MAX PG: 7.8 MMHG
BH CV ECHO MEAS - AO MEAN PG (FULL): 0.86 MMHG
BH CV ECHO MEAS - AO MEAN PG: 4.2 MMHG
BH CV ECHO MEAS - AO ROOT AREA (BSA CORRECTED): 1.5
BH CV ECHO MEAS - AO ROOT AREA: 8.3 CM^2
BH CV ECHO MEAS - AO ROOT DIAM: 3.2 CM
BH CV ECHO MEAS - AO V2 MAX: 140 CM/SEC
BH CV ECHO MEAS - AO V2 MEAN: 97.7 CM/SEC
BH CV ECHO MEAS - AO V2 VTI: 33.8 CM
BH CV ECHO MEAS - ASC AORTA: 2.9 CM
BH CV ECHO MEAS - AVA(I,A): 3.5 CM^2
BH CV ECHO MEAS - AVA(I,D): 3.5 CM^2
BH CV ECHO MEAS - AVA(V,A): 3.5 CM^2
BH CV ECHO MEAS - AVA(V,D): 3.5 CM^2
BH CV ECHO MEAS - BSA(HAYCOCK): 2.3 M^2
BH CV ECHO MEAS - BSA: 2.2 M^2
BH CV ECHO MEAS - BZI_BMI: 35.1 KILOGRAMS/M^2
BH CV ECHO MEAS - BZI_METRIC_HEIGHT: 172.7 CM
BH CV ECHO MEAS - BZI_METRIC_WEIGHT: 104.8 KG
BH CV ECHO MEAS - EDV(CUBED): 109.4 ML
BH CV ECHO MEAS - EDV(MOD-SP4): 88.9 ML
BH CV ECHO MEAS - EDV(TEICH): 106.6 ML
BH CV ECHO MEAS - EF(CUBED): 77.9 %
BH CV ECHO MEAS - EF(MOD-BP): 73 %
BH CV ECHO MEAS - EF(MOD-SP4): 72.5 %
BH CV ECHO MEAS - EF(TEICH): 70 %
BH CV ECHO MEAS - ESV(CUBED): 24.1 ML
BH CV ECHO MEAS - ESV(MOD-SP4): 24.5 ML
BH CV ECHO MEAS - ESV(TEICH): 31.9 ML
BH CV ECHO MEAS - FS: 39.6 %
BH CV ECHO MEAS - IVS/LVPW: 0.98
BH CV ECHO MEAS - IVSD: 1.2 CM
BH CV ECHO MEAS - LA DIMENSION(2D): 2.8 CM
BH CV ECHO MEAS - LV DIASTOLIC VOL/BSA (35-75): 40.9 ML/M^2
BH CV ECHO MEAS - LV MASS(C)D: 209.2 GRAMS
BH CV ECHO MEAS - LV MASS(C)DI: 96.3 GRAMS/M^2
BH CV ECHO MEAS - LV MAX PG: 7.2 MMHG
BH CV ECHO MEAS - LV MEAN PG: 3.3 MMHG
BH CV ECHO MEAS - LV SYSTOLIC VOL/BSA (12-30): 11.3 ML/M^2
BH CV ECHO MEAS - LV V1 MAX: 134.1 CM/SEC
BH CV ECHO MEAS - LV V1 MEAN: 83.2 CM/SEC
BH CV ECHO MEAS - LV V1 VTI: 32.3 CM
BH CV ECHO MEAS - LVIDD: 4.8 CM
BH CV ECHO MEAS - LVIDS: 2.9 CM
BH CV ECHO MEAS - LVOT AREA: 3.7 CM^2
BH CV ECHO MEAS - LVOT DIAM: 2.2 CM
BH CV ECHO MEAS - LVPWD: 1.2 CM
BH CV ECHO MEAS - MV A MAX VEL: 79.4 CM/SEC
BH CV ECHO MEAS - MV DEC SLOPE: 254 CM/SEC^2
BH CV ECHO MEAS - MV DEC TIME: 0.32 SEC
BH CV ECHO MEAS - MV E MAX VEL: 81.5 CM/SEC
BH CV ECHO MEAS - MV E/A: 1
BH CV ECHO MEAS - MV MAX PG: 2.9 MMHG
BH CV ECHO MEAS - MV MEAN PG: 1.4 MMHG
BH CV ECHO MEAS - MV V2 MAX: 84.7 CM/SEC
BH CV ECHO MEAS - MV V2 MEAN: 56.2 CM/SEC
BH CV ECHO MEAS - MV V2 VTI: 33.2 CM
BH CV ECHO MEAS - MVA(VTI): 3.6 CM^2
BH CV ECHO MEAS - PA ACC TIME: 0.08 SEC
BH CV ECHO MEAS - PA MAX PG (FULL): 1.1 MMHG
BH CV ECHO MEAS - PA MAX PG: 4.5 MMHG
BH CV ECHO MEAS - PA MEAN PG (FULL): 0.75 MMHG
BH CV ECHO MEAS - PA MEAN PG: 2.7 MMHG
BH CV ECHO MEAS - PA PR(ACCEL): 41.6 MMHG
BH CV ECHO MEAS - PA V2 MAX: 105.7 CM/SEC
BH CV ECHO MEAS - PA V2 MEAN: 77.3 CM/SEC
BH CV ECHO MEAS - PA V2 VTI: 26.7 CM
BH CV ECHO MEAS - PULM A REVS DUR: 0.1 SEC
BH CV ECHO MEAS - PULM A REVS VEL: 26 CM/SEC
BH CV ECHO MEAS - PULM DIAS VEL: 55.7 CM/SEC
BH CV ECHO MEAS - PULM S/D: 0.88
BH CV ECHO MEAS - PULM SYS VEL: 49.3 CM/SEC
BH CV ECHO MEAS - RAP SYSTOLE: 3 MMHG
BH CV ECHO MEAS - RV MAX PG: 3.4 MMHG
BH CV ECHO MEAS - RV MEAN PG: 1.9 MMHG
BH CV ECHO MEAS - RV V1 MAX: 91.5 CM/SEC
BH CV ECHO MEAS - RV V1 MEAN: 64.6 CM/SEC
BH CV ECHO MEAS - RV V1 VTI: 22.7 CM
BH CV ECHO MEAS - RVDD: 2.2 CM
BH CV ECHO MEAS - RVSP: 21.4 MMHG
BH CV ECHO MEAS - SI(AO): 128.5 ML/M^2
BH CV ECHO MEAS - SI(CUBED): 39.2 ML/M^2
BH CV ECHO MEAS - SI(LVOT): 54.4 ML/M^2
BH CV ECHO MEAS - SI(MOD-SP4): 29.7 ML/M^2
BH CV ECHO MEAS - SI(TEICH): 34.4 ML/M^2
BH CV ECHO MEAS - SV(AO): 279.2 ML
BH CV ECHO MEAS - SV(CUBED): 85.3 ML
BH CV ECHO MEAS - SV(LVOT): 118.2 ML
BH CV ECHO MEAS - SV(MOD-SP4): 64.5 ML
BH CV ECHO MEAS - SV(TEICH): 74.7 ML
BH CV ECHO MEAS - TR MAX VEL: 214.4 CM/SEC
BH CV NUCLEAR PRIOR STUDY: 3
BH CV REST NUCLEAR ISOTOPE DOSE: 7.2 MCI
BH CV STRESS BP STAGE 1: NORMAL
BH CV STRESS BP STAGE 2: NORMAL
BH CV STRESS BP STAGE 3: NORMAL
BH CV STRESS COMMENTS STAGE 1: NORMAL
BH CV STRESS DOSE REGADENOSON STAGE 1: 0.4
BH CV STRESS DURATION MIN STAGE 1: 1
BH CV STRESS DURATION MIN STAGE 2: 1
BH CV STRESS DURATION SEC STAGE 2: 0
BH CV STRESS HR STAGE 1: 86
BH CV STRESS HR STAGE 2: 91
BH CV STRESS HR STAGE 3: 88
BH CV STRESS NUCLEAR ISOTOPE DOSE: 21.9 MCI
BH CV STRESS PROTOCOL 1: NORMAL
BH CV STRESS RECOVERY BP: NORMAL MMHG
BH CV STRESS RECOVERY HR: 78 BPM
BH CV STRESS STAGE 1: 1
BH CV STRESS STAGE 2: 2
BH CV STRESS STAGE 3: 3
CHOLEST SERPL-MCNC: 149 MG/DL (ref 0–200)
HDLC SERPL-MCNC: 46 MG/DL (ref 40–60)
LDLC SERPL CALC-MCNC: 87 MG/DL (ref 0–100)
LDLC/HDLC SERPL: 1.87 {RATIO}
LV EF 2D ECHO EST: 60 %
LV EF NUC BP: 64 %
MAXIMAL PREDICTED HEART RATE: 162 BPM
PERCENT MAX PREDICTED HR: 58.02 %
STRESS BASELINE BP: NORMAL MMHG
STRESS BASELINE HR: 61 BPM
STRESS PERCENT HR: 68 %
STRESS POST PEAK BP: NORMAL MMHG
STRESS POST PEAK HR: 94 BPM
STRESS TARGET HR: 138 BPM
TRIGL SERPL-MCNC: 84 MG/DL (ref 0–150)
TROPONIN T SERPL-MCNC: <0.01 NG/ML (ref 0–0.03)
VLDLC SERPL-MCNC: 16 MG/DL (ref 5–40)

## 2021-12-18 PROCEDURE — G0378 HOSPITAL OBSERVATION PER HR: HCPCS

## 2021-12-18 PROCEDURE — 80061 LIPID PANEL: CPT | Performed by: INTERNAL MEDICINE

## 2021-12-18 PROCEDURE — 0 TECHNETIUM TETROFOSMIN KIT: Performed by: FAMILY MEDICINE

## 2021-12-18 PROCEDURE — A9502 TC99M TETROFOSMIN: HCPCS | Performed by: FAMILY MEDICINE

## 2021-12-18 PROCEDURE — 93018 CV STRESS TEST I&R ONLY: CPT | Performed by: INTERNAL MEDICINE

## 2021-12-18 PROCEDURE — 93306 TTE W/DOPPLER COMPLETE: CPT

## 2021-12-18 PROCEDURE — 25010000002 REGADENOSON 0.4 MG/5ML SOLUTION: Performed by: FAMILY MEDICINE

## 2021-12-18 PROCEDURE — 78452 HT MUSCLE IMAGE SPECT MULT: CPT

## 2021-12-18 PROCEDURE — 76705 ECHO EXAM OF ABDOMEN: CPT

## 2021-12-18 PROCEDURE — 93016 CV STRESS TEST SUPVJ ONLY: CPT | Performed by: NURSE PRACTITIONER

## 2021-12-18 PROCEDURE — 36415 COLL VENOUS BLD VENIPUNCTURE: CPT | Performed by: INTERNAL MEDICINE

## 2021-12-18 PROCEDURE — 84484 ASSAY OF TROPONIN QUANT: CPT | Performed by: INTERNAL MEDICINE

## 2021-12-18 PROCEDURE — 93306 TTE W/DOPPLER COMPLETE: CPT | Performed by: INTERNAL MEDICINE

## 2021-12-18 PROCEDURE — 93017 CV STRESS TEST TRACING ONLY: CPT

## 2021-12-18 PROCEDURE — 78452 HT MUSCLE IMAGE SPECT MULT: CPT | Performed by: INTERNAL MEDICINE

## 2021-12-18 RX ADMIN — REGADENOSON 0.4 MG: 0.08 INJECTION, SOLUTION INTRAVENOUS at 10:18

## 2021-12-18 RX ADMIN — ACETAMINOPHEN ORAL SOLUTION 649.6 MG: 650 SOLUTION ORAL at 11:24

## 2021-12-18 RX ADMIN — SODIUM CHLORIDE, PRESERVATIVE FREE 3 ML: 5 INJECTION INTRAVENOUS at 11:25

## 2021-12-18 RX ADMIN — LEVOTHYROXINE SODIUM 150 MCG: 0.15 TABLET ORAL at 05:13

## 2021-12-18 RX ADMIN — TETROFOSMIN 1 DOSE: 1.38 INJECTION, POWDER, LYOPHILIZED, FOR SOLUTION INTRAVENOUS at 06:45

## 2021-12-18 RX ADMIN — LOSARTAN POTASSIUM 25 MG: 25 TABLET, FILM COATED ORAL at 11:24

## 2021-12-18 RX ADMIN — TETROFOSMIN 1 DOSE: 1.38 INJECTION, POWDER, LYOPHILIZED, FOR SOLUTION INTRAVENOUS at 10:18

## 2021-12-18 NOTE — DISCHARGE SUMMARY
Date of Discharge:  12/18/2021    Discharge Diagnosis:   **Chest pain [R07.9]   Uncontrolled hypertension [I10]   GERD without esophagitis [K21.9]   Hyperlipidemia    Presenting Problem/History of Present Illness  Active Hospital Problems    Diagnosis  POA   • **Chest pain [R07.9]  Yes   • Uncontrolled hypertension [I10]  Yes   • GERD without esophagitis [K21.9]  Yes      Resolved Hospital Problems   No resolved problems to display.          Hospital Course  Patient is a 56 y.o. female with h/o GERD and previous episode of Takotsubo syndrome in the remote past who presented with onset of SSCP with nausea and lightheadedness.  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  In the ER her BP was markedly elevated but normalized without intervention.  EKG, telemetry, troponins, echo and stress test were all normal.  She had no recurrence of symptoms.  RUQ ultrasound was normal.  Symptoms may have been related to increased BP or possibly due to esophageal spasm from GERD. She will continue PPI, monitor blood pressure at home and follow up promptly with PCP.  If symptoms recur she will seek medical attention. At the time of discharge she is stable/without pain, tolerating a regular diet and has a normal exam.        Procedures Performed         Consults:   Consults       Date and Time Order Name Status Description    12/17/2021  3:50 PM Inpatient Cardiology Consult Completed     12/17/2021  1:35 PM Family Medicine Consult              Pertinent Test Results:    Lab Results (most recent)       Procedure Component Value Units Date/Time    Troponin [[id number]]  (Normal) Collected: 12/18/21 0510    Specimen: Blood Updated: 12/18/21 0608     Troponin T <0.010 ng/mL     Narrative:      Troponin T Reference Range:  <= 0.03 ng/mL-   Negative for AMI  >0.03 ng/mL-     Abnormal for myocardial necrosis.  Clinicians would have to utilize clinical acumen, EKG, Troponin and serial changes to determine if it  is an Acute Myocardial Infarction or myocardial injury due to an underlying chronic condition.       Results may be falsely decreased if patient taking Biotin.      Lipid Panel [[id number]] Collected: 12/18/21 0510    Specimen: Blood Updated: 12/18/21 0608     Total Cholesterol 149 mg/dL      Triglycerides 84 mg/dL      HDL Cholesterol 46 mg/dL      LDL Cholesterol  87 mg/dL      VLDL Cholesterol 16 mg/dL      LDL/HDL Ratio 1.87    Narrative:      Cholesterol Reference Ranges  (U.S. Department of Health and Human Services ATP III Classifications)    Desirable          <200 mg/dL  Borderline High    200-239 mg/dL  High Risk          >240 mg/dL      Triglyceride Reference Ranges  (U.S. Department of Health and Human Services ATP III Classifications)    Normal           <150 mg/dL  Borderline High  150-199 mg/dL  High             200-499 mg/dL  Very High        >500 mg/dL    HDL Reference Ranges  (U.S. Department of Health and Human Services ATP III Classifcations)    Low     <40 mg/dl (major risk factor for CHD)  High    >60 mg/dl ('negative' risk factor for CHD)        LDL Reference Ranges  (U.S. Department of Health and Human Services ATP III Classifcations)    Optimal          <100 mg/dL  Near Optimal     100-129 mg/dL  Borderline High  130-159 mg/dL  High             160-189 mg/dL  Very High        >189 mg/dL    Troponin [[id number]]  (Normal) Collected: 12/17/21 1249    Specimen: Blood Updated: 12/17/21 1612     Troponin T <0.010 ng/mL     Narrative:      Troponin T Reference Range:  <= 0.03 ng/mL-   Negative for AMI  >0.03 ng/mL-     Abnormal for myocardial necrosis.  Clinicians would have to utilize clinical acumen, EKG, Troponin and serial changes to determine if it is an Acute Myocardial Infarction or myocardial injury due to an underlying chronic condition.       Results may be falsely decreased if patient taking Biotin.      Extra Tubes [[id number]] Collected: 12/17/21 1249    Specimen: Blood Updated: 12/17/21  1400    Narrative:      The following orders were created for panel order Extra Tubes.  Procedure                               Abnormality         Status                     ---------                               -----------         ------                     Gold Top - SST[[id number]]                                   Final result               Light Blue Top[[id number]]                                   Final result                 Please view results for these tests on the individual orders.    Light Blue Top [[id number]] Collected: 12/17/21 1249    Specimen: Blood Updated: 12/17/21 1400     Extra Tube hold for add-on     Comment: Auto resulted       COVID PRE-OP / PRE-PROCEDURE SCREENING ORDER (NO ISOLATION) - Swab, Nasopharynx [[id number]]  (Normal) Collected: 12/17/21 1249    Specimen: Swab from Nasopharynx Updated: 12/17/21 1331    Narrative:      The following orders were created for panel order COVID PRE-OP / PRE-PROCEDURE SCREENING ORDER (NO ISOLATION) - Swab, Nasopharynx.  Procedure                               Abnormality         Status                     ---------                               -----------         ------                     COVID-19,CEPHEID/CHARAN,CO...[[id number]]  Normal              Final result                 Please view results for these tests on the individual orders.    COVID-19,CEPHEID/CHARAN,COR/RYAN/PAD/POPPY IN-HOUSE(OR EMERGENT/ADD-ON),NP SWAB IN TRANSPORT MEDIA 3-4 HR TAT, RT-PCR - Swab, Nasopharynx [[id number]]  (Normal) Collected: 12/17/21 1249    Specimen: Swab from Nasopharynx Updated: 12/17/21 1331     COVID19 Not Detected    Narrative:      Fact sheet for providers: https://www.fda.gov/media/686003/download     Fact sheet for patients: https://www.fda.gov/media/381335/download  Fact sheet for providers: https://www.fda.gov/media/055160/download     Fact sheet for patients: https://www.fda.gov/media/685997/download    TSH [[id number]]  (Normal) Collected: 12/17/21 1249     Specimen: Blood Updated: 12/17/21 1331     TSH 0.644 uIU/mL     Comprehensive Metabolic Panel [[id number]]  (Abnormal) Collected: 12/17/21 1249    Specimen: Blood Updated: 12/17/21 1327     Glucose 90 mg/dL      BUN 20 mg/dL      Creatinine 0.67 mg/dL      Sodium 138 mmol/L      Potassium 4.1 mmol/L      Chloride 101 mmol/L      CO2 26.0 mmol/L      Calcium 9.0 mg/dL      Total Protein 7.1 g/dL      Albumin 4.40 g/dL      ALT (SGPT) 29 U/L      AST (SGOT) 17 U/L      Alkaline Phosphatase 93 U/L      Total Bilirubin 0.2 mg/dL      eGFR Non African Amer 90 mL/min/1.73      Globulin 2.7 gm/dL      A/G Ratio 1.6 g/dL      BUN/Creatinine Ratio 29.9     Anion Gap 11.0 mmol/L     Narrative:      GFR Normal >60  Chronic Kidney Disease <60  Kidney Failure <15      Lipase [[id number]]  (Normal) Collected: 12/17/21 1249    Specimen: Blood Updated: 12/17/21 1327     Lipase 36 U/L     Magnesium [[id number]]  (Normal) Collected: 12/17/21 1249    Specimen: Blood Updated: 12/17/21 1327     Magnesium 2.0 mg/dL     Gold Top - SST [[id number]] Collected: 12/17/21 1249    Specimen: Blood Updated: 12/17/21 1313    CBC & Differential [[id number]]  (Abnormal) Collected: 12/17/21 1249    Specimen: Blood Updated: 12/17/21 1259    Narrative:      The following orders were created for panel order CBC & Differential.  Procedure                               Abnormality         Status                     ---------                               -----------         ------                     CBC Auto Differential[[id number]]        Abnormal            Final result                 Please view results for these tests on the individual orders.    CBC Auto Differential [[id number]]  (Abnormal) Collected: 12/17/21 1249    Specimen: Blood Updated: 12/17/21 1259     WBC 11.80 10*3/mm3      RBC 4.86 10*6/mm3      Hemoglobin 14.1 g/dL      Hematocrit 42.7 %      MCV 87.9 fL      MCH 29.0 pg      MCHC 33.0 g/dL      RDW 13.8 %      RDW-SD 42.9 fl      MPV 8.2 fL       Platelets 291 10*3/mm3      Neutrophil % 53.6 %      Lymphocyte % 40.6 %      Monocyte % 4.5 %      Eosinophil % 0.5 %      Basophil % 0.8 %      Neutrophils, Absolute 6.30 10*3/mm3      Lymphocytes, Absolute 4.80 10*3/mm3      Monocytes, Absolute 0.50 10*3/mm3      Eosinophils, Absolute 0.10 10*3/mm3      Basophils, Absolute 0.10 10*3/mm3      nRBC 0.3 /100 WBC              Results for orders placed during the hospital encounter of 12/17/21    Adult Transthoracic Echo Complete W/ Cont if Necessary Per Protocol    Interpretation Summary  · Estimated left ventricular EF = 60% Left ventricular systolic function is normal.    Indications  Chest pain    Technically satisfactory study.  Mitral valve is structurally normal.  Tricuspid valve is structurally normal.  Aortic valve is structurally normal.  Pulmonic valve could not be well visualized.  No evidence for mitral tricuspid or aortic regurgitation is seen by Doppler study.  Left atrium is normal in size.  Right atrium is normal in size.  Left ventricle is normal in size and contractility with ejection fraction of 60%. Concentric left ventricular hypertrophy.  Right ventricle is normal in size.  Atrial septum is intact.  Aorta is normal.  No pericardial effusion or intracardiac thrombus is seen.    Impression  Structurally and functionally normal cardiac valves.  Left ventricular size and contractility is normal with ejection fraction of 60%.  Concentric left ventricular hypertrophy.              Condition on Discharge:       Vital Signs  Temp:  [97.4 °F (36.3 °C)-98.2 °F (36.8 °C)] 97.4 °F (36.3 °C)  Heart Rate:  [56-65] 64  Resp:  [16-18] 18  BP: (129-151)/(73-82) 143/82    Physical Exam:     General Appearance:    Alert, cooperative, in no acute distress   Head:    Normocephalic, without obvious abnormality, atraumatic   Eyes:            Lids and lashes normal, conjunctivae and sclerae normal, no   icterus, no pallor, corneas clear, PERRLA   Ears:    Ears  appear intact with no abnormalities noted   Throat:   No oral lesions, no thrush, oral mucosa moist   Neck:   No adenopathy, supple, trachea midline, no thyromegaly, no   carotid bruit, no JVD   Lungs:     Clear to auscultation,respirations regular, even and                  unlabored    Heart:    Regular rhythm and normal rate, normal S1 and S2, no            murmur, no gallop, no rub, no click   Chest Wall:    No abnormalities observed   Abdomen:     Normal bowel sounds, no masses, no organomegaly, soft        non-tender, non-distended, no guarding, no rebound                tenderness   Extremities:   Moves all extremities well, no edema, no cyanosis, no             redness   Pulses:   Pulses palpable and equal bilaterally   Skin:   No bleeding, bruising or rash   Lymph nodes:   No palpable adenopathy   Neurologic:   Cranial nerves 2 - 12 grossly intact, sensation intact, DTR       present and equal bilaterally       Discharge Disposition  Home or Self Care    Discharge Medications     Discharge Medications        Continue These Medications        Instructions Start Date   diclofenac 75 MG EC tablet  Commonly known as: VOLTAREN   diclofenac sodium 75 mg tablet,delayed release   TAKE ONE (1) TABLET BY MOUTH DAILY AS NEEDED      escitalopram 20 MG tablet  Commonly known as: LEXAPRO   TAKE ONE (1) TABLET BY MOUTH EVERY DAY      eszopiclone 2 MG tablet  Commonly known as: LUNESTA   TAKE ONE (1) TABLET BY MOUTH EVERY NIGHT AT BEDTIME      levothyroxine 150 MCG tablet  Commonly known as: SYNTHROID, LEVOTHROID   levothyroxine 150 mcg tablet   TAKE ONE (1) TABLET BY MOUTH EVERY DAY      losartan 25 MG tablet  Commonly known as: COZAAR   losartan 25 mg tablet   TAKE ONE (1) TABLET BY MOUTH EVERY DAY      multivitamin capsule   MULTIVITAMINS CAPS      pantoprazole 40 MG EC tablet  Commonly known as: PROTONIX   TAKE ONE (1) TABLET BY MOUTH EVERY DAY      simvastatin 40 MG tablet  Commonly known as: ZOCOR   simvastatin 40 mg  tablet   TAKE ONE (1) TABLET BY MOUTH EVERY DAY      vitamin D3 125 MCG (5000 UT) capsule capsule   5,000 Units, Oral, Daily               Discharge Diet:  Healthy heart    Activity at Discharge:  No restrictions    Follow-up Appointments  No future appointments.  Additional Instructions for the Follow-ups that You Need to Schedule       Discharge Follow-up with PCP   As directed       Currently Documented PCP:    Monika Griffin APRN    PCP Phone Number:    [phone]     Follow Up Details: 3-4 weeks                 Test Results Pending at Discharge        Lilian Haider MD  12/18/21  13:14 EST    Time: Discharge 25 min

## 2021-12-18 NOTE — PLAN OF CARE
Goal Outcome Evaluation:  Plan of Care Reviewed With: patient        Progress: improving  Outcome Summary: Stress test and Echo scheduled this am.

## 2021-12-19 LAB — QT INTERVAL: 373 MS

## 2021-12-20 NOTE — CASE MANAGEMENT/SOCIAL WORK
Case Management Discharge Note          Selected Continued Care - Discharged on 12/18/2021 Admission date: 12/17/2021 - Discharge disposition: Home or Self Care     Final Discharge Disposition Code: 01 - home or self-care

## 2022-01-17 ENCOUNTER — TRANSCRIBE ORDERS (OUTPATIENT)
Dept: ADMINISTRATIVE | Facility: HOSPITAL | Age: 57
End: 2022-01-17

## 2022-01-17 DIAGNOSIS — R07.9 CHEST PAIN, UNSPECIFIED TYPE: Primary | ICD-10-CM

## 2022-02-01 ENCOUNTER — HOSPITAL ENCOUNTER (OUTPATIENT)
Dept: CT IMAGING | Facility: HOSPITAL | Age: 57
Discharge: HOME | End: 2022-02-01
Admitting: NURSE PRACTITIONER

## 2022-02-01 DIAGNOSIS — R07.9 CHEST PAIN, UNSPECIFIED TYPE: ICD-10-CM

## 2022-02-01 LAB — CREAT BLDA-MCNC: 0.7 MG/DL (ref 0.6–1.3)

## 2022-02-01 PROCEDURE — 82565 ASSAY OF CREATININE: CPT

## 2022-02-01 PROCEDURE — 71260 CT THORAX DX C+: CPT

## 2022-02-01 PROCEDURE — 0 IOPAMIDOL PER 1 ML: Performed by: NURSE PRACTITIONER

## 2022-02-01 RX ADMIN — IOPAMIDOL 100 ML: 755 INJECTION, SOLUTION INTRAVENOUS at 12:38

## 2022-02-03 ENCOUNTER — TRANSCRIBE ORDERS (OUTPATIENT)
Dept: PHYSICAL THERAPY | Facility: CLINIC | Age: 57
End: 2022-02-03

## 2022-02-03 DIAGNOSIS — M54.2 PAIN, NECK: Primary | ICD-10-CM

## 2022-02-03 DIAGNOSIS — M79.601 PAIN OF RIGHT UPPER EXTREMITY: ICD-10-CM

## 2022-02-14 ENCOUNTER — TREATMENT (OUTPATIENT)
Dept: PHYSICAL THERAPY | Facility: CLINIC | Age: 57
End: 2022-02-14

## 2022-02-14 DIAGNOSIS — M79.601 PAIN OF RIGHT UPPER EXTREMITY: ICD-10-CM

## 2022-02-14 DIAGNOSIS — R07.89 STERNOCOSTAL PAIN: ICD-10-CM

## 2022-02-14 DIAGNOSIS — M54.2 PAIN, NECK: Primary | ICD-10-CM

## 2022-02-14 PROCEDURE — 97162 PT EVAL MOD COMPLEX 30 MIN: CPT | Performed by: PHYSICAL THERAPIST

## 2022-02-14 PROCEDURE — 97530 THERAPEUTIC ACTIVITIES: CPT | Performed by: PHYSICAL THERAPIST

## 2022-02-14 NOTE — PROGRESS NOTES
"Physical Therapy Initial Evaluation and Plan of Care    Patient: Tatyana Moseley   : 1965  Diagnosis/ICD-10 Code:  Pain, neck [M54.2]  Referring practitioner: RUSS Han  Date of Initial Visit: 2022  Today's Date: 2022  Patient seen for 1 sessions           Subjective Questionnaire: QuickDASH: 38%  Subjective Questionnaire: NDI: 22%      Subjective Evaluation    History of Present Illness  Mechanism of injury: 56 year old female who reports to clinic today with current complaints of pain across her left chest from her breast bone across and below her collar bone.  She notes it first started about last October when she was working on a computer a lot for e-learning.  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  She reports her pain was quite severe and she was admitted to hospital for cardiac evaluation which was negative.  She also had chest CT scan of her chest and a mammogram.  She also reports significant issues with sleeping and that her wrist and fingers on her L hand feel \"like they are going to sleep.\"  She does report that she sleeps in a recliner, which she as done for years due to hip pain.    Other Medical History  Disease of thyroid gland; GERD (gastroesophageal reflux disease); Hyperlipidemia; Insomnia; Hypertension            Patient Occupation: teacher at [employer]  Current pain ratin  At best pain ratin  At worst pain ratin  Location: right arm, chest and neck  Quality: tightness, tingling.  Aggravating factors: sleeping (computer work)  Progression: no change    Social Support  Lives with: spouse    Hand dominance: right    Treatments  Previous treatment: medication  Current treatment: physical therapy  Patient Goals  Patient goals for therapy: decreased pain, increased motion, independence with ADLs/IADLs and increased strength  Patient goal: improve sleep     "       Objective        Special Questions  Patient is experiencing disturbed sleep.     Additional Special Questions  Denies dizziness, fainting, headaches, nausea/motion sickness & tinnitus       Postural Observations  Seated posture: fair  Correction of posture: has no consistent effect        Palpation   Left   Hypertonic in the levator scapulae and upper trapezius.     Right   Hypertonic in the levator scapulae and upper trapezius.     Additional Palpation Details  Tender R first rib, R sternoclavicular joint, & upper R sternocostal joints    Neurological Testing     Sensation   Cervical/Thoracic   Left   Intact: light touch    Right   Intact: light touch    Reflexes   Left   Biceps (C5/C6): trace (1+)  Brachioradialis (C6): trace (1+)  Triceps (C7): trace (1+)    Right   Biceps (C5/C6): trace (1+)  Brachioradialis (C6): trace (1+)  Triceps (C7): trace (1+)    Active Range of Motion   Cervical/Thoracic Spine   Cervical    Flexion: WFL  Extension: Neck active extension: mild pain at end range. WFL  Left lateral flexion: WFL  Right lateral flexion: WFL  Left rotation: WFL  Right rotation: WFL    Thoracic   Flexion: WFL  Extension: WFL  Left lateral flexion: WFL  Right lateral flexion: WFL  Left rotation: WFL  Right rotation: WFL    Strength/Myotome Testing   Cervical Spine   Neck extension: WFL  Neck flexion: WFL    Left   Normal strength    Right   Normal strength    Tests   Cervical     Left   Negative active compression ([place]), cervical distraction and Spurling's sign.     Right   Negative active compression (Barrington), cervical distraction and Spurling's sign.       See Exercise, Manual, and Modality Logs for complete treatment     Patient education:  Possible costochondritis and use of roll behind back to help with sleeping and lumbar roll in sitting.    Assessment & Plan     Assessment  Impairments: abnormal muscle tone, activity intolerance, lacks appropriate home exercise program and pain with  function  Functional Limitations: carrying objects, lifting, sleeping, pulling, pushing, uncomfortable because of pain, sitting and unable to perform repetitive tasks  Assessment details: 56 year old female who presents with the impairments noted above secondary to right pectoralis pain, sternum pain and sternocostal pain.  She also has decreased tolerance to desk work, numbness and tingling in R hand, & muscle tension/guarding in her upper back and neck. These impairments decrease her ability and tolerance to perform her normal daily activities.  This patient presents with a level of complexity and her condition is such that the services required can be safely and effectively performed only by a qualified PT or supervised PTA.     Prognosis: fair    Goals  Plan Goals: STG (3 weeks)  1) Independent in home program for upper extremity, chest and neck stretching  2) Independent in home program for posture correction  3) Demonstrates basic understanding of condition and role in treatment progression  4) Tolerates initiation of resistive strengthening   5) Demonstrates slight improvement in tolerance to daily activity as evidenced by Neck Disability Index score of 15% or lower and in QuickDASH score of 30% or lower.    LTG (12 Weeks)  1) Independent in home program for self-management of condition  2) Reports minimal impact of chest and neck pain on her sleep.  3) Demonstrates significant improvement in tolerance to daily activity as evidenced by Neck disability index score of 10% or lower and in QuickDASH score of 10% or lower.  4) Demonstrates good posture in standing and sitting to improve tolerance to those positions.  5) Demonstrates normal cervical spinal mechanics to allow for pain-free motion of the cervical spine.  6) Reports ability to return to her daily work routine without significant neck pain            History # of Personal Factors and/or Comorbidities: MODERATE (1-2)  Examination of Body System(s): # of  elements: MODERATE (3)  Clinical Presentation: EVOLVING  Clinical Decision Making: MODERATE    Timed:         Manual Therapy:         mins  05511;     Therapeutic Exercise:         mins  54509;     Neuromuscular Shay:        mins  13444;    Therapeutic Activity:     10     mins  05671;     Gait Training:           mins  77461;     Ultrasound:          mins  71453;    Ionto                                   mins   93149  Self Care                            mins   82320  Canalith Repos         mins 52460      Un-Timed:  Electrical Stimulation:         mins  40606 ( );  Traction          mins 98321  Dry Needle                 ______ mins DRYNDL  Low Eval          Mins  43743  Mod Eval     35     Mins  40041  High Eval                            Mins  24090  Re-Eval                               mins  94009        Timed Treatment:   10   mins   Total Treatment:     45   mins    PT SIGNATURE: Lloyd Lopez, ROBERTO   DATE TREATMENT INITIATED: 2/14/2022    Initial Certification  Certification Period: 2/14/2022 through 5/15/2022  I certify that the therapy services are furnished while this patient is under my care.  The services outlined above are required by this patient, and will be reviewed every 90 days.     PHYSICIAN: Monika Griffin, APRN      DATE:     Please sign and return via fax to [phone]. Thank you, [facility].

## 2022-02-22 ENCOUNTER — TREATMENT (OUTPATIENT)
Dept: PHYSICAL THERAPY | Facility: CLINIC | Age: 57
End: 2022-02-22

## 2022-02-22 DIAGNOSIS — M54.2 PAIN, NECK: Primary | ICD-10-CM

## 2022-02-22 DIAGNOSIS — M79.601 PAIN OF RIGHT UPPER EXTREMITY: ICD-10-CM

## 2022-02-22 DIAGNOSIS — R07.89 STERNOCOSTAL PAIN: ICD-10-CM

## 2022-02-22 PROCEDURE — 97140 MANUAL THERAPY 1/> REGIONS: CPT | Performed by: PHYSICAL THERAPIST

## 2022-02-22 PROCEDURE — 97110 THERAPEUTIC EXERCISES: CPT | Performed by: PHYSICAL THERAPIST

## 2022-02-22 NOTE — PROGRESS NOTES
Physical Therapy Daily Progress Note    Patient: Tatyana Moseley   : 1965  Diagnosis/ICD-10 Code:  Pain, neck [M54.2]  Referring practitioner: RUSS Han  Date of Initial Visit: Type: THERAPY  Noted: 2022  Today's Date: 2022  Patient seen for 2 sessions           Subjective     Tatyana Moseley reports: No significant changes thus far.  Still has chest and shoulder pain.   The towel roll behind her back did not help all that much.      Objective   See Exercise, Manual, and Modality Logs for complete treatment.     Pain to palpation along R clavicle, pec insertion and sternum.    Muscle tension and guarding in R upper trap.      Decreased thoracic extension noted in R upper thoracic spine that improved after mobilization.    Issued, instructed in & performed home exercise program below:     Access Code: [email or web address]  URL: [email or web address]  Date: 2022  Prepared by: Luis Fernando Lopez    Exercises  Doorway Pec Stretch at 60 Elevation - 1 x daily - 7 x weekly - 1 sets - 3 reps - 20 sec hold  Seated Upper Trapezius Stretch - 1 x daily - 7 x weekly - 1 sets - 3 reps - 20 sec hold  Seated Thoracic Lumbar Extension - 1 x daily - 7 x weekly - 1 sets - 10 reps  Sidelying Thoracic Rotation with Open Book - 1 x daily - 7 x weekly - 1 sets - 10 reps  Seated Cervical Retraction - 1 x daily - 7 x weekly - 1 sets - 10 reps - 5 sec hold  Seated Scapular Retraction - 1 x daily - 7 x weekly - 1 sets - 10 reps - 5 sec hold    Assessment/Plan  Still has pain along clavicle and sternum.   Did seem to respond well to mobilizations.       Assess response to today's treatment.  Rule out costochondritis vs thoracic biomechanical issue.           Manual Therapy:    10     mins  61942;  Therapeutic Exercise:    35     mins  74356;     Neuromuscular Shay:        mins  83815;    Therapeutic Activity:          mins  20231;     Gait Training:           mins  82905;     Ultrasound:          mins  65642;     Electrical Stimulation:         mins  97131 ( );  Dry Needling          mins self-pay    Timed Treatment:   45   mins   Total Treatment:     45   mins    Lloyd Lopez PT  Physical Therapist

## 2022-02-25 ENCOUNTER — TREATMENT (OUTPATIENT)
Dept: PHYSICAL THERAPY | Facility: CLINIC | Age: 57
End: 2022-02-25

## 2022-02-25 DIAGNOSIS — M79.601 PAIN OF RIGHT UPPER EXTREMITY: ICD-10-CM

## 2022-02-25 DIAGNOSIS — R07.89 STERNOCOSTAL PAIN: ICD-10-CM

## 2022-02-25 DIAGNOSIS — M54.2 PAIN, NECK: Primary | ICD-10-CM

## 2022-02-25 PROCEDURE — 97110 THERAPEUTIC EXERCISES: CPT | Performed by: PHYSICAL THERAPIST

## 2022-02-25 PROCEDURE — 97140 MANUAL THERAPY 1/> REGIONS: CPT | Performed by: PHYSICAL THERAPIST

## 2022-02-25 NOTE — PROGRESS NOTES
Physical Therapy Daily Progress Note    Patient: Tatyana Moseley   : 1965  Diagnosis/ICD-10 Code:  Pain, neck [M54.2]  Referring practitioner: RUSS Han  Date of Initial Visit: Type: THERAPY  Noted: 2022  Today's Date: 2022  Patient seen for 3 sessions           Subjective     Tatyana Moseley reports: Still with pain along R collar bone and breast bone.  May be slightly better in other areas.  Reports pec stretch causes discomfort.      Objective   See Exercise, Manual, and Modality Logs for complete treatment.     Continues with pain to palpation along R clavicle, pec insertion and sternum.    Held pec stretch in doorway due to increased discomfort with that exercise.    Seated Scapular Retraction - 1 x daily - 7 x weekly - 1 sets - 10 reps - 5 sec hold    Assessment/Plan  Still has pain along clavicle and sternum.   Again, did seem to respond well to mobilizations.  Very tight scalenes, so stretching added for that.    Assess response to today's treatment.  Continue to address scalenes.         Manual Therapy:    15    mins  48430;  Therapeutic Exercise:    30     mins  11597;     Neuromuscular Shay:        mins  20286;    Therapeutic Activity:          mins  72582;     Gait Training:           mins  07034;     Ultrasound:          mins  48900;    Electrical Stimulation:         mins  68101 ( );  Dry Needling          mins self-pay    Timed Treatment:   45   mins   Total Treatment:     45   mins    Lloyd Lopez PT  Physical Therapist

## 2022-02-28 ENCOUNTER — TREATMENT (OUTPATIENT)
Dept: PHYSICAL THERAPY | Facility: CLINIC | Age: 57
End: 2022-02-28

## 2022-02-28 DIAGNOSIS — M54.2 PAIN, NECK: Primary | ICD-10-CM

## 2022-02-28 DIAGNOSIS — R07.89 STERNOCOSTAL PAIN: ICD-10-CM

## 2022-02-28 DIAGNOSIS — M79.601 PAIN OF RIGHT UPPER EXTREMITY: ICD-10-CM

## 2022-02-28 PROCEDURE — 97110 THERAPEUTIC EXERCISES: CPT | Performed by: PHYSICAL THERAPIST

## 2022-02-28 PROCEDURE — 97140 MANUAL THERAPY 1/> REGIONS: CPT | Performed by: PHYSICAL THERAPIST

## 2022-03-08 ENCOUNTER — TELEPHONE (OUTPATIENT)
Dept: PHYSICAL THERAPY | Facility: CLINIC | Age: 57
End: 2022-03-08

## 2022-03-22 ENCOUNTER — TREATMENT (OUTPATIENT)
Dept: PHYSICAL THERAPY | Facility: CLINIC | Age: 57
End: 2022-03-22

## 2022-03-22 DIAGNOSIS — M54.2 PAIN, NECK: Primary | ICD-10-CM

## 2022-03-22 DIAGNOSIS — M79.601 PAIN OF RIGHT UPPER EXTREMITY: ICD-10-CM

## 2022-03-22 DIAGNOSIS — R07.89 STERNOCOSTAL PAIN: ICD-10-CM

## 2022-03-22 PROCEDURE — 97110 THERAPEUTIC EXERCISES: CPT | Performed by: PHYSICAL THERAPIST

## 2022-03-22 PROCEDURE — 97140 MANUAL THERAPY 1/> REGIONS: CPT | Performed by: PHYSICAL THERAPIST

## 2022-03-22 NOTE — PROGRESS NOTES
Physical Therapy Daily Progress Note    Patient: Tatyana Moseley   : 1965  Diagnosis/ICD-10 Code:  Pain, neck [M54.2]  Referring practitioner: RUSS Han  Date of Initial Visit: Type: THERAPY  Noted: 2022  Today's Date: 3/22/2022  Patient seen for 5 sessions           Subjective     Tatyana Moseley reports:   Her breast bone pain is essentially gone and she no longer having pain in her R hand.  She is having pain in her neck and upper back/shoulder muscles.       Objective   See Exercise, Manual, and Modality Logs for complete treatment.     Shifted focus of treatment to upper back, shoulders and neck as sternal pain has not been present.    Assessment/Plan  Doing very well in regards to sternal pain which has not bothered her in several day.  Responded well to treatment with treatment to neck and upper back/shoulder muscles.       Add more resistive exercises as tolerated.   Progress postural exercises as tolerated.       Manual Therapy:    18    mins  32573;  Therapeutic Exercise:    25     mins  45159;     Neuromuscular Shay:        mins  76306;    Therapeutic Activity:          mins  45734;     Gait Training:           mins  59108;     Ultrasound:          mins  89827;    Electrical Stimulation:         mins  25155 ( );  Dry Needling          mins self-pay    Timed Treatment:   43   mins   Total Treatment:     43   mins    Lloyd Lopez PT  Physical Therapist

## 2022-11-22 ENCOUNTER — TRANSCRIBE ORDERS (OUTPATIENT)
Dept: ADMINISTRATIVE | Facility: HOSPITAL | Age: 57
End: 2022-11-22

## 2022-11-22 ENCOUNTER — HOSPITAL ENCOUNTER (OUTPATIENT)
Dept: CARDIOLOGY | Facility: HOSPITAL | Age: 57
Discharge: HOME | End: 2022-11-22

## 2022-11-22 ENCOUNTER — LAB (OUTPATIENT)
Dept: LAB | Facility: HOSPITAL | Age: 57
End: 2022-11-22

## 2022-11-22 DIAGNOSIS — Z01.818 PRE-OP EXAMINATION: ICD-10-CM

## 2022-11-22 DIAGNOSIS — Z01.818 PRE-OP EXAMINATION: Primary | ICD-10-CM

## 2022-11-22 LAB
ABO GROUP BLD: NORMAL
BLD GP AB SCN SERPL QL: NEGATIVE
RH BLD: POSITIVE
T&S EXPIRATION DATE: NORMAL

## 2022-11-22 PROCEDURE — 86901 BLOOD TYPING SEROLOGIC RH(D): CPT

## 2022-11-22 PROCEDURE — 80048 BASIC METABOLIC PNL TOTAL CA: CPT

## 2022-11-22 PROCEDURE — 93005 ELECTROCARDIOGRAM TRACING: CPT | Performed by: ORTHOPAEDIC SURGERY

## 2022-11-22 PROCEDURE — 36415 COLL VENOUS BLD VENIPUNCTURE: CPT

## 2022-11-22 PROCEDURE — 93010 ELECTROCARDIOGRAM REPORT: CPT | Performed by: INTERNAL MEDICINE

## 2022-11-22 PROCEDURE — 85025 COMPLETE CBC W/AUTO DIFF WBC: CPT

## 2022-11-22 PROCEDURE — 86900 BLOOD TYPING SEROLOGIC ABO: CPT

## 2022-11-22 PROCEDURE — 86850 RBC ANTIBODY SCREEN: CPT

## 2022-11-23 LAB
ANION GAP SERPL CALCULATED.3IONS-SCNC: 10.7 MMOL/L (ref 5–15)
BASOPHILS # BLD AUTO: 0.08 10*3/MM3 (ref 0–0.2)
BASOPHILS NFR BLD AUTO: 0.4 % (ref 0–1.5)
BUN SERPL-MCNC: 22 MG/DL (ref 6–20)
BUN/CREAT SERPL: 22 (ref 7–25)
CALCIUM SPEC-SCNC: 9.5 MG/DL (ref 8.6–10.5)
CHLORIDE SERPL-SCNC: 101 MMOL/L (ref 98–107)
CO2 SERPL-SCNC: 29.3 MMOL/L (ref 22–29)
CREAT SERPL-MCNC: 1 MG/DL (ref 0.57–1)
DEPRECATED RDW RBC AUTO: 40.1 FL (ref 37–54)
EGFRCR SERPLBLD CKD-EPI 2021: 65 ML/MIN/1.73
EOSINOPHIL # BLD AUTO: 0.06 10*3/MM3 (ref 0–0.4)
EOSINOPHIL NFR BLD AUTO: 0.3 % (ref 0.3–6.2)
ERYTHROCYTE [DISTWIDTH] IN BLOOD BY AUTOMATED COUNT: 12.6 % (ref 12.3–15.4)
GLUCOSE SERPL-MCNC: 58 MG/DL (ref 65–99)
HCT VFR BLD AUTO: 41.6 % (ref 34–46.6)
HGB BLD-MCNC: 14 G/DL (ref 12–15.9)
IMM GRANULOCYTES # BLD AUTO: 0.09 10*3/MM3 (ref 0–0.05)
IMM GRANULOCYTES NFR BLD AUTO: 0.4 % (ref 0–0.5)
LYMPHOCYTES # BLD AUTO: 10.09 10*3/MM3 (ref 0.7–3.1)
LYMPHOCYTES NFR BLD AUTO: 48 % (ref 19.6–45.3)
MCH RBC QN AUTO: 29.3 PG (ref 26.6–33)
MCHC RBC AUTO-ENTMCNC: 33.7 G/DL (ref 31.5–35.7)
MCV RBC AUTO: 87 FL (ref 79–97)
MONOCYTES # BLD AUTO: 0.87 10*3/MM3 (ref 0.1–0.9)
MONOCYTES NFR BLD AUTO: 4.1 % (ref 5–12)
NEUTROPHILS NFR BLD AUTO: 46.8 % (ref 42.7–76)
NEUTROPHILS NFR BLD AUTO: 9.85 10*3/MM3 (ref 1.7–7)
NRBC BLD AUTO-RTO: 0 /100 WBC (ref 0–0.2)
PLATELET # BLD AUTO: 279 10*3/MM3 (ref 140–450)
PMV BLD AUTO: 9.9 FL (ref 6–12)
POTASSIUM SERPL-SCNC: 4.2 MMOL/L (ref 3.5–5.2)
RBC # BLD AUTO: 4.78 10*6/MM3 (ref 3.77–5.28)
SODIUM SERPL-SCNC: 141 MMOL/L (ref 136–145)
WBC NRBC COR # BLD: 21.04 10*3/MM3 (ref 3.4–10.8)

## 2022-11-29 LAB — QT INTERVAL: 409 MS

## 2023-12-04 ENCOUNTER — TELEPHONE (OUTPATIENT)
Dept: CARDIOLOGY | Facility: CLINIC | Age: 58
End: 2023-12-04
Payer: COMMERCIAL

## 2023-12-04 NOTE — TELEPHONE ENCOUNTER
Lvm for PT to call us back and jerrica her appt she hasn't been seen since 2017 and Dr Malcolm will need to see Pt to get this to sign. Called [facility] where pt is having surgery to advised them. They are going to try and reach pt to let her know.

## 2023-12-04 NOTE — TELEPHONE ENCOUNTER
Caller: Tatyana Moseley    Relationship to patient: Self    Best call back number: [phone]    Type of visit: FOLLOW UP     Requested date: ANYTIME AFTER 12.15.23    Additional notes: PT IS NEEDING CARDIAC CLEARANCE FOR KNEE SURGERY THAT IS SCHEDULED FOR 1.10.24, PT WAS TOLD SHE NEEDS A FOLLOW UP APPT BEFORE GETTING THE CLEARANCE, NO EXACT TIMEFRAME IN CHART AS TO WHEN TO GET PT IN FOR THIS APPT, PLEASE ADVISE AND CALL BACK TO GET PT SCHEDULED.

## 2023-12-18 ENCOUNTER — LAB (OUTPATIENT)
Dept: LAB | Facility: HOSPITAL | Age: 58
End: 2023-12-18
Payer: COMMERCIAL

## 2023-12-18 ENCOUNTER — HOSPITAL ENCOUNTER (OUTPATIENT)
Dept: CARDIOLOGY | Facility: HOSPITAL | Age: 58
Discharge: HOME | End: 2023-12-18
Payer: COMMERCIAL

## 2023-12-18 ENCOUNTER — TRANSCRIBE ORDERS (OUTPATIENT)
Dept: ADMINISTRATIVE | Facility: HOSPITAL | Age: 58
End: 2023-12-18
Payer: COMMERCIAL

## 2023-12-18 DIAGNOSIS — Z01.818 OTHER SPECIFIED PRE-OPERATIVE EXAMINATION: ICD-10-CM

## 2023-12-18 DIAGNOSIS — Z01.818 OTHER SPECIFIED PRE-OPERATIVE EXAMINATION: Primary | ICD-10-CM

## 2023-12-18 LAB
ALBUMIN SERPL-MCNC: 4.2 G/DL (ref 3.5–5.2)
ANION GAP SERPL CALCULATED.3IONS-SCNC: 9 MMOL/L (ref 5–15)
BUN SERPL-MCNC: 18 MG/DL (ref 8–23)
BUN/CREAT SERPL: 26.9 (ref 7–25)
CALCIUM SPEC-SCNC: 9.4 MG/DL (ref 8.6–10.5)
CHLORIDE SERPL-SCNC: 106 MMOL/L (ref 98–107)
CO2 SERPL-SCNC: 28 MMOL/L (ref 22–29)
CREAT SERPL-MCNC: 0.67 MG/DL (ref 0.57–1)
DEPRECATED RDW RBC AUTO: 43.3 FL (ref 37–54)
EGFRCR SERPLBLD CKD-EPI 2021: 100.2 ML/MIN/1.73
EOSINOPHIL # BLD MANUAL: 0.14 10*3/MM3 (ref 0–0.4)
EOSINOPHIL NFR BLD MANUAL: 1 % (ref 0.3–6.2)
ERYTHROCYTE [DISTWIDTH] IN BLOOD BY AUTOMATED COUNT: 13.9 % (ref 12.3–15.4)
GLUCOSE SERPL-MCNC: 71 MG/DL (ref 65–99)
HBA1C MFR BLD: 5 % (ref 4.8–5.6)
HCT VFR BLD AUTO: 40.3 % (ref 34–46.6)
HGB BLD-MCNC: 13.1 G/DL (ref 12–15.9)
LYMPHOCYTES # BLD MANUAL: 8.26 10*3/MM3 (ref 0.7–3.1)
LYMPHOCYTES NFR BLD MANUAL: 5 % (ref 5–12)
MCH RBC QN AUTO: 29.3 PG (ref 26.6–33)
MCHC RBC AUTO-ENTMCNC: 32.5 G/DL (ref 31.5–35.7)
MCV RBC AUTO: 90.1 FL (ref 79–97)
MONOCYTES # BLD: 0.7 10*3/MM3 (ref 0.1–0.9)
MRSA DNA SPEC QL NAA+PROBE: NORMAL
NEUTROPHILS # BLD AUTO: 4.9 10*3/MM3 (ref 1.7–7)
NEUTROPHILS NFR BLD MANUAL: 35 % (ref 42.7–76)
PATHOLOGY REVIEW: YES
PLAT MORPH BLD: NORMAL
PLATELET # BLD AUTO: 250 10*3/MM3 (ref 140–450)
PMV BLD AUTO: 8.6 FL (ref 6–12)
POTASSIUM SERPL-SCNC: 4.8 MMOL/L (ref 3.5–5.2)
RBC # BLD AUTO: 4.47 10*6/MM3 (ref 3.77–5.28)
RBC MORPH BLD: NORMAL
SCAN SLIDE: NORMAL
SODIUM SERPL-SCNC: 143 MMOL/L (ref 136–145)
VARIANT LYMPHS NFR BLD MANUAL: 59 % (ref 19.6–45.3)
WBC MORPH BLD: NORMAL
WBC NRBC COR # BLD AUTO: 14 10*3/MM3 (ref 3.4–10.8)

## 2023-12-18 PROCEDURE — 83036 HEMOGLOBIN GLYCOSYLATED A1C: CPT

## 2023-12-18 PROCEDURE — 87641 MR-STAPH DNA AMP PROBE: CPT

## 2023-12-18 PROCEDURE — 80048 BASIC METABOLIC PNL TOTAL CA: CPT

## 2023-12-18 PROCEDURE — 85007 BL SMEAR W/DIFF WBC COUNT: CPT

## 2023-12-18 PROCEDURE — 36415 COLL VENOUS BLD VENIPUNCTURE: CPT

## 2023-12-18 PROCEDURE — 82040 ASSAY OF SERUM ALBUMIN: CPT

## 2023-12-18 PROCEDURE — 93005 ELECTROCARDIOGRAM TRACING: CPT | Performed by: ORTHOPAEDIC SURGERY

## 2023-12-18 PROCEDURE — 85025 COMPLETE CBC W/AUTO DIFF WBC: CPT

## 2023-12-19 LAB
LAB AP CASE REPORT: NORMAL
PATH REPORT.FINAL DX SPEC: NORMAL
QT INTERVAL: 432 MS
QTC INTERVAL: 447 MS

## 2023-12-20 ENCOUNTER — OFFICE VISIT (OUTPATIENT)
Dept: CARDIOLOGY | Facility: CLINIC | Age: 58
End: 2023-12-20
Payer: COMMERCIAL

## 2023-12-20 VITALS
WEIGHT: 225 LBS | DIASTOLIC BLOOD PRESSURE: 76 MMHG | BODY MASS INDEX: 34.1 KG/M2 | HEIGHT: 68 IN | OXYGEN SATURATION: 98 % | HEART RATE: 67 BPM | SYSTOLIC BLOOD PRESSURE: 127 MMHG

## 2023-12-20 DIAGNOSIS — E78.5 DYSLIPIDEMIA: ICD-10-CM

## 2023-12-20 DIAGNOSIS — Z01.810 PREOPERATIVE CARDIOVASCULAR EXAMINATION: Primary | ICD-10-CM

## 2023-12-20 DIAGNOSIS — I10 ESSENTIAL HYPERTENSION: ICD-10-CM

## 2023-12-20 PROCEDURE — 99214 OFFICE O/P EST MOD 30 MIN: CPT | Performed by: INTERNAL MEDICINE

## 2023-12-20 RX ORDER — LEVOTHYROXINE SODIUM 137 UG/1
TABLET ORAL
COMMUNITY

## 2023-12-20 RX ORDER — DULOXETIN HYDROCHLORIDE 30 MG/1
CAPSULE, DELAYED RELEASE ORAL
COMMUNITY
Start: 2023-11-21

## 2023-12-20 RX ORDER — HYDROXYCHLOROQUINE SULFATE 200 MG/1
TABLET, FILM COATED ORAL
COMMUNITY

## 2023-12-20 NOTE — PROGRESS NOTES
Encounter Date:12/20/2023  Last seen 12/27/2021      Patient ID: Tatyana Moseley is a 58 y.o. female.    Preoperative cardiovascular evaluation prior to having knee surgery.  Hypertension  Dyslipidemia  Hypothyroidism  Chest discomfort    History of present illness  Patient is planning on having knee surgery scheduled for 01/10/2023.  Patient was diagnosed to have CLL since last seen in 2021.  Patient is being observed.    Since I have last seen, the patient has been without any chest discomfort ,shortness of breath, palpitations, dizziness or syncope.  Denies having any headache ,abdominal pain ,nausea, vomiting , diarrhea constipation, loss of weight or loss of appetite.  Denies having any excessive bruising ,hematuria or blood in the stool.    Review of all systems negative except as indicated.    Reviewed ROS.  [[[[[[[[[[[[[[[[[[[[[[[[[  Impression  ==============  - Preoperative cardiovascular evaluation prior to having right knee surgery.  Scheduled for 1/10/2023.  (Dr. Parks)    Patient is asymptomatic.    Stress Cardiolite test 12/18/2021  Findings consistent with a normal ECG stress test.  Left ventricular ejection fraction is normal. (Calculated EF = 64%).  Myocardial perfusion imaging indicates a normal myocardial perfusion study with no evidence of ischemia.  Impressions are consistent with a low risk study.    Echocardiogram 12/18/2021   Structurally and functionally normal cardiac valves.  Left ventricular size and contractility is normal with ejection fraction of 60%.  Concentric left ventricular hypertrophy.     Cristy scan Cardiolite test showed mild apical thinning without ischemia.  2017     -history of mild elevation of troponin suggestive of possible subendocardial myocardial infarction 2006. Patient had normal left ventricular function and normal coronary arteries 2006.     -hypothyroidism and dyslipidemia.  Hypertension    - History of CLL.     -status post appendectomy thyroid biopsy   hysterectomy and lumpectomy.  Hip surgery     -family history of coronary artery disease.     -Non-smoker     -allergic to @[unfilled]@and Cipro.  Latex.  ============  Plan  =============   Preoperative cardiovascular evaluation prior to having right knee surgery.  Scheduled for 1/10/2023.  (Dr. Parks)  Patient is asymptomatic.    Recent EKG from 2023 was reviewed and showed no ischemic changes.    Stress Cardiolite test and echocardiogram from  was reviewed and educated patient.    Okay with planned surgery.  Patient was provided with supporting document.    Follow-up in the office as needed.    Further plan will depend on patient's progress.    Reviewed and updated-2023  [[[[[[[[[[[[[[[[[[[[[[[              No diagnosis found.LAB RESULTS (LAST 7 DAYS)    CBC  Results from last 7 days   Lab Units 23  1238   WBC 10*3/mm3 14.00*   RBC 10*6/mm3 4.47   HEMOGLOBIN g/dL 13.1   HEMATOCRIT % 40.3   MCV fL 90.1   PLATELETS 10*3/mm3 250       BMP  Results from last 7 days   Lab Units 23  1238   SODIUM mmol/L 143   POTASSIUM mmol/L 4.8   CHLORIDE mmol/L 106   CO2 mmol/L 28.0   BUN mg/dL 18   CREATININE mg/dL 0.67   GLUCOSE mg/dL 71       CMP   Results from last 7 days   Lab Units 23  1238   SODIUM mmol/L 143   POTASSIUM mmol/L 4.8   CHLORIDE mmol/L 106   CO2 mmol/L 28.0   BUN mg/dL 18   CREATININE mg/dL 0.67   GLUCOSE mg/dL 71   ALBUMIN g/dL 4.2         BNP        TROPONIN        CoAg        Creatinine Clearance  Estimated Creatinine Clearance: 111.5 mL/min (by C-G formula based on SCr of 0.67 mg/dL).    ABG        Radiology  No radiology results for the last day                The following portions of the patient's history were reviewed and updated as appropriate: allergies, current medications, past family history, past medical history, past social history, past surgical history, and problem list.    Review of Systems   Constitutional: Negative for malaise/fatigue.    Cardiovascular:  Negative for chest pain, dyspnea on exertion, leg swelling and palpitations.   Respiratory:  Negative for cough and shortness of breath.    Gastrointestinal:  Negative for abdominal pain, nausea and vomiting.   Neurological:  Negative for dizziness, focal weakness, headaches, light-headedness and numbness.   All other systems reviewed and are negative.      Current Outpatient Medications:     DULoxetine (CYMBALTA) 30 MG capsule, TAKE ONE (1) CAPSULE BY MOUTH EVERY DAY, Disp: , Rfl:     eszopiclone (LUNESTA) 2 MG tablet, TAKE ONE (1) TABLET BY MOUTH EVERY NIGHT AT BEDTIME, Disp: , Rfl:     hydroxychloroquine (PLAQUENIL) 200 MG tablet, TAKE TWO (2) TABLETS BY MOUTH EVERY DAY, Disp: , Rfl:     levothyroxine (SYNTHROID, LEVOTHROID) 137 MCG tablet, TAKE ONE (1) TABLET BY MOUTH EVERY DAY, Disp: , Rfl:     losartan (COZAAR) 25 MG tablet, losartan 25 mg tablet  TAKE ONE (1) TABLET BY MOUTH EVERY DAY, Disp: , Rfl:     pantoprazole (PROTONIX) 40 MG EC tablet, TAKE ONE (1) TABLET BY MOUTH EVERY DAY, Disp: , Rfl:     simvastatin (ZOCOR) 40 MG tablet, simvastatin 40 mg tablet  TAKE ONE (1) TABLET BY MOUTH EVERY DAY, Disp: , Rfl:     vitamin D3 125 MCG (5000 UT) capsule capsule, Take 1 capsule by mouth Daily., Disp: , Rfl:     Multiple Vitamin (multivitamin) capsule, MULTIVITAMINS CAPS (Patient not taking: Reported on 2023), Disp: , Rfl:     Allergies   Allergen Reactions    Nickel Hives    Latex Nausea Only    Ciprofloxacin Itching and Rash       Family History   Problem Relation Age of Onset    Heart disease Mother     Hypertension Mother     Heart disease Father     Hypertension Father        Past Surgical History:   Procedure Laterality Date    APPENDECTOMY      CARDIAC CATHETERIZATION  2006     SECTION      CYST REMOVAL      FOOT ARTHROPLASTY      HYSTERECTOMY         Past Medical History:   Diagnosis Date    Allergic     Nickel, Cipro, Latex    Anxiety     Chest pain 2021    Disease of  "thyroid gland     Emphysema of lung 02/02/2022    Diagnosed from CT Scan.  Seeing a pulmonologist in May.    GERD (gastroesophageal reflux disease)     Hyperlipidemia     Hypertension     Insomnia     Myocardial infarction 10/06/1986    Sleep apnea Oct, 2022    Urinary tract infection     I’ve had many; doing better since I started supplements,       Family History   Problem Relation Age of Onset    Heart disease Mother     Hypertension Mother     Heart disease Father     Hypertension Father        Social History     Socioeconomic History    Marital status:    Tobacco Use    Smoking status: Never     Passive exposure: Never    Smokeless tobacco: Never   Vaping Use    Vaping Use: Never used   Substance and Sexual Activity    Alcohol use: Yes     Comment: Maybe one craft beer a month.    Drug use: Never    Sexual activity: Yes     Partners: Male     Birth control/protection: Post-menopausal, Hysterectomy         Procedures      Objective:       Physical Exam    Ht 172.7 cm (68\")   Wt 102 kg (225 lb)   BMI 34.21 kg/m²   The patient is alert, oriented and in no distress.    Vital signs as noted above.    Head and neck revealed no carotid bruits or jugular venous distension.  No thyromegaly or lymphadenopathy is present.    Lungs clear.  No wheezing.  Breath sounds are normal bilaterally.    Heart normal first and second heart sounds.  No murmur..  No pericardial rub is present.  No gallop is present.    Abdomen soft and nontender.  No organomegaly is present.    Extremities revealed good peripheral pulses without any pedal edema.    Skin warm and dry.    Musculoskeletal system is grossly normal.    CNS grossly normal.    Reviewed and updated.        "

## 2024-02-06 NOTE — PROGRESS NOTES
HEMATOLOGY ONCOLOGY OUTPATIENT CONSULTATION       Patient name: Tatyana Moseley  : 1965  MRN: [id number]  Primary Care Physician: Pietro Rahman NP  Referring Physician: Floyd Burrows MD  Reason For Consult: Chronic lymphocytic leukemia.    History of Present Illness:  Patient is a 58 y.o.     2024: In the office for the first time to transfer care.   first came to attention between  and  when she was found to have elevated white cell count with lymphocytosis on routine blood work.  Initially her white cell count was around 13,200/mm³ with 63% lymphocytes.  She did not have anemia or thrombocytopenia.  She carried a history of rheumatoid arthritis and intermittently was treated with steroids.  Eventually she underwent lumbar vertebral fusion in late .  Early in  she had a sample of blood analyzed by flow cytometry and again B-cell lymphocytosis, consistent with chronic lymphocytic leukemia, was reported.  Given her lack of symptoms and the lack of obvious progression of the condition observation was chosen.  She is in the office for initial evaluation reporting no symptoms.  She recently underwent total knee arthroplasty on the right and received steroids for this.  She is recovering well and has had no problems.  She has remained free of fevers, nocturnal diaphoresis or unintended weight loss.  She denies respiratory symptoms and has had no digestive problems.  She continues to experience arthralgia of multiple articulations.  On exam she is a well-built woman who appears chronically ill but does not seem in any distress.  She is neither jaundiced nor pale.  The lungs are clear and the heart is regular.  The abdomen is soft and nontender.  No edema.  The laboratory exams were reviewed.  No need for intervention at this point.  She is to have a FISH panel to determine prognosis on a sample of blood today and will see me to review the results  in approximately 4 months.    Past Medical History:   Diagnosis Date    Allergic     Nickel, Cipro, Latex    Anxiety     Chest pain 2021    Disease of thyroid gland     Emphysema of lung 2022    Diagnosed from CT Scan.  Seeing a pulmonologist in May.    GERD (gastroesophageal reflux disease)     Hyperlipidemia     Hypertension     Insomnia     Myocardial infarction 10/06/1986    Sleep apnea Oct, 2022    Urinary tract infection     I’ve had many; doing better since I started supplements,     Past Surgical History:   Procedure Laterality Date    APPENDECTOMY      CARDIAC CATHETERIZATION       SECTION      CYST REMOVAL      FOOT ARTHROPLASTY      HYSTERECTOMY      REPLACEMENT TOTAL KNEE  01/10/2024       Current Outpatient Medications:     DULoxetine (CYMBALTA) 30 MG capsule, TAKE ONE (1) CAPSULE BY MOUTH EVERY DAY, Disp: , Rfl:     eszopiclone (LUNESTA) 2 MG tablet, TAKE ONE (1) TABLET BY MOUTH EVERY NIGHT AT BEDTIME, Disp: , Rfl:     hydroxychloroquine (PLAQUENIL) 200 MG tablet, TAKE TWO (2) TABLETS BY MOUTH EVERY DAY, Disp: , Rfl:     levothyroxine (SYNTHROID, LEVOTHROID) 137 MCG tablet, TAKE ONE (1) TABLET BY MOUTH EVERY DAY, Disp: , Rfl:     losartan (COZAAR) 25 MG tablet, losartan 25 mg tablet  TAKE ONE (1) TABLET BY MOUTH EVERY DAY, Disp: , Rfl:     meloxicam (MOBIC) 15 MG tablet, Take 1 tablet by mouth Daily., Disp: , Rfl:     pantoprazole (PROTONIX) 40 MG EC tablet, TAKE ONE (1) TABLET BY MOUTH EVERY DAY, Disp: , Rfl:     simvastatin (ZOCOR) 40 MG tablet, simvastatin 40 mg tablet  TAKE ONE (1) TABLET BY MOUTH EVERY DAY, Disp: , Rfl:     vitamin D3 125 MCG (5000 UT) capsule capsule, Take 1 capsule by mouth Daily., Disp: , Rfl:     Multiple Vitamin (multivitamin) capsule, MULTIVITAMINS CAPS (Patient not taking: Reported on 2023), Disp: , Rfl:     Allergies   Allergen Reactions    Nickel Hives    Latex Nausea Only    Ciprofloxacin Itching and Rash     Family History   Problem  Relation Age of Onset    Heart disease Mother     Hypertension Mother     Leukemia Mother     Heart disease Father     Hypertension Father     Prostate cancer Father 74     Cancer-related family history includes Prostate cancer (age of onset: 74) in her father.    Social History     Tobacco Use    Smoking status: Never     Passive exposure: Never    Smokeless tobacco: Never   Vaping Use    Vaping Use: Never used   Substance Use Topics    Alcohol use: Yes     Comment: Maybe one craft beer a month.    Drug use: Never     Social History     Social History Narrative    Not on file     ROS:   Review of Systems   Constitutional:  Negative for activity change, appetite change, chills, diaphoresis, fatigue, fever and unexpected weight change.   HENT:  Negative for congestion, dental problem, drooling, ear discharge, ear pain, facial swelling, hearing loss, mouth sores, nosebleeds, postnasal drip, rhinorrhea, sinus pressure, sinus pain, sneezing, sore throat, tinnitus, trouble swallowing and voice change.    Eyes:  Negative for photophobia, pain, discharge, redness, itching and visual disturbance.   Respiratory:  Negative for apnea, cough, choking, chest tightness, shortness of breath, wheezing and stridor.    Cardiovascular:  Negative for chest pain, palpitations and leg swelling.   Gastrointestinal:  Negative for abdominal distention, abdominal pain, anal bleeding, blood in stool, constipation, diarrhea, nausea, rectal pain and vomiting.   Endocrine: Negative for cold intolerance, heat intolerance, polydipsia and polyuria.   Genitourinary:  Negative for decreased urine volume, difficulty urinating, dysuria, flank pain, frequency, genital sores, hematuria and urgency.   Musculoskeletal:  Negative for arthralgias, back pain, gait problem, joint swelling, myalgias, neck pain and neck stiffness.   Skin:  Negative for color change, pallor and rash.   Neurological:  Negative for dizziness, tremors, seizures, syncope, facial  "asymmetry, speech difficulty, weakness, light-headedness, numbness and headaches.   Hematological:  Negative for adenopathy. Does not bruise/bleed easily.   Psychiatric/Behavioral:  Negative for agitation, behavioral problems, confusion, decreased concentration, hallucinations, self-injury, sleep disturbance and suicidal ideas. The patient is not nervous/anxious.        Objective:    Vital Signs:  Vitals:    02/07/24 0850   BP: 135/86   Pulse: 67   SpO2: 99%   Weight: 101 kg (223 lb 9.6 oz)   Height: 172.7 cm (68\")   PainSc: 0-No pain     Body mass index is 34 kg/m².    ECOG  (0) Fully active, able to carry on all predisease performance without restriction    Physical Exam:   Physical Exam  Constitutional:       General: She is not in acute distress.     Appearance: She is not ill-appearing, toxic-appearing or diaphoretic.      Comments: Well-built and well-oriented woman who does not seem in distress.  She is conversant and oriented.  BMI greater than 34 kg/m².   HENT:      Head: Normocephalic and atraumatic.      Right Ear: External ear normal.      Left Ear: External ear normal.      Nose: Nose normal.      Mouth/Throat:      Mouth: Mucous membranes are moist.      Pharynx: Oropharynx is clear. No oropharyngeal exudate or posterior oropharyngeal erythema.   Eyes:      General: No scleral icterus.        Right eye: No discharge.         Left eye: No discharge.      Conjunctiva/sclera: Conjunctivae normal.      Pupils: Pupils are equal, round, and reactive to light.   Cardiovascular:      Rate and Rhythm: Normal rate and regular rhythm.      Pulses: Normal pulses.      Heart sounds: No murmur heard.     No friction rub. No gallop.   Pulmonary:      Effort: No respiratory distress.      Breath sounds: No stridor. No wheezing, rhonchi or rales.   Abdominal:      General: Abdomen is flat. Bowel sounds are normal. There is no distension.      Palpations: Abdomen is soft. There is no mass.      Tenderness: There is no " abdominal tenderness. There is no right CVA tenderness, left CVA tenderness, guarding or rebound.      Hernia: No hernia is present.   Musculoskeletal:         General: No tenderness, deformity or signs of injury.      Cervical back: No rigidity.      Right lower leg: No edema.      Left lower leg: No edema.   Lymphadenopathy:      Cervical: No cervical adenopathy.   Skin:     Coloration: Skin is not jaundiced.      Findings: No bruising, lesion or rash.   Neurological:      General: No focal deficit present.      Mental Status: She is alert and oriented to person, place, and time.      Cranial Nerves: No cranial nerve deficit.      Motor: No weakness.      Gait: Gait normal.   Psychiatric:         Mood and Affect: Mood normal.         Behavior: Behavior normal.         Thought Content: Thought content normal.         Judgment: Judgment normal.     LONG Chávez MD performed the physical exam on 2/7/2024 as documented above.    Lab Results - Last 18 Months   Lab Units 02/07/24  0855 12/18/23  1238 11/22/22  1612   WBC 10*3/mm3 19.41* 14.00* 21.04*   HEMOGLOBIN g/dL 14.3 13.1 14.0   HEMATOCRIT % 44.1 40.3 41.6   PLATELETS 10*3/mm3 188 250 279   MCV fL 91.9 90.1 87.0     Lab Results - Last 18 Months   Lab Units 02/07/24  0945 12/18/23  1238 11/22/22  1612   SODIUM mmol/L 141 143 141   POTASSIUM mmol/L 5.1 4.8 4.2   CHLORIDE mmol/L 104 106 101   CO2 mmol/L 30.0* 28.0 29.3*   BUN mg/dL 20 18 22*   CREATININE mg/dL 0.70 0.67 1.00   CALCIUM mg/dL 10.1 9.4 9.5   BILIRUBIN mg/dL 0.3  --   --    ALK PHOS U/L 70  --   --    ALT (SGPT) U/L 24  --   --    AST (SGOT) U/L 19  --   --    GLUCOSE mg/dL 89 71 58*     Lab Results   Component Value Date    GLUCOSE 89 02/07/2024    BUN 20 02/07/2024    CREATININE 0.70 02/07/2024    EGFRIFNONA 90 12/17/2021    BCR 28.6 (H) 02/07/2024    K 5.1 02/07/2024    CO2 30.0 (H) 02/07/2024    CALCIUM 10.1 02/07/2024    ALBUMIN 4.5 02/07/2024    AST 19 02/07/2024    ALT 24 02/07/2024      Assessment & Plan     1.  Chronic lymphocytic leukemia stage 0.  Entirely asymptomatic and without any impact on red cell count or platelets.  At this point no intervention is justifiable.  I have requested a prognostic FISH panel that would aid in making decisions in the future.  I have asked her to return to see me in approximately 4 months.  2.  Rheumatoid arthritis  3.  I have reviewed the records from [facility] as well as from [facility].  Reviewed all laboratory exams.  4.  She is to see me in approximately 4 months.    Marc Chávez MD on 2/7/2024 at 1250.

## 2024-02-07 ENCOUNTER — CONSULT (OUTPATIENT)
Dept: ONCOLOGY | Facility: CLINIC | Age: 59
End: 2024-02-07
Payer: COMMERCIAL

## 2024-02-07 ENCOUNTER — TELEPHONE (OUTPATIENT)
Dept: ONCOLOGY | Facility: CLINIC | Age: 59
End: 2024-02-07
Payer: COMMERCIAL

## 2024-02-07 ENCOUNTER — LAB (OUTPATIENT)
Dept: LAB | Facility: HOSPITAL | Age: 59
End: 2024-02-07
Payer: COMMERCIAL

## 2024-02-07 VITALS
SYSTOLIC BLOOD PRESSURE: 135 MMHG | WEIGHT: 223.6 LBS | BODY MASS INDEX: 33.89 KG/M2 | DIASTOLIC BLOOD PRESSURE: 86 MMHG | OXYGEN SATURATION: 99 % | HEIGHT: 68 IN | HEART RATE: 67 BPM

## 2024-02-07 DIAGNOSIS — C91.10 CLL (CHRONIC LYMPHOCYTIC LEUKEMIA): ICD-10-CM

## 2024-02-07 DIAGNOSIS — C91.10 CLL (CHRONIC LYMPHOCYTIC LEUKEMIA): Primary | ICD-10-CM

## 2024-02-07 LAB
ALBUMIN SERPL-MCNC: 4.5 G/DL (ref 3.5–5.2)
ALBUMIN/GLOB SERPL: 2 G/DL
ALP SERPL-CCNC: 70 U/L (ref 39–117)
ALT SERPL W P-5'-P-CCNC: 24 U/L (ref 1–33)
ANION GAP SERPL CALCULATED.3IONS-SCNC: 7 MMOL/L (ref 5–15)
AST SERPL-CCNC: 19 U/L (ref 1–32)
BASOPHILS # BLD AUTO: 0.04 10*3/MM3 (ref 0–0.2)
BASOPHILS NFR BLD AUTO: 0.2 % (ref 0–1.5)
BILIRUB SERPL-MCNC: 0.3 MG/DL (ref 0–1.2)
BUN SERPL-MCNC: 20 MG/DL (ref 8–23)
BUN/CREAT SERPL: 28.6 (ref 7–25)
CALCIUM SPEC-SCNC: 10.1 MG/DL (ref 8.6–10.5)
CHLORIDE SERPL-SCNC: 104 MMOL/L (ref 98–107)
CO2 SERPL-SCNC: 30 MMOL/L (ref 22–29)
CREAT SERPL-MCNC: 0.7 MG/DL (ref 0.57–1)
DEPRECATED RDW RBC AUTO: 48 FL (ref 37–54)
EGFRCR SERPLBLD CKD-EPI 2021: 99.2 ML/MIN/1.73
EOSINOPHIL # BLD AUTO: 0.38 10*3/MM3 (ref 0–0.4)
EOSINOPHIL NFR BLD AUTO: 2 % (ref 0.3–6.2)
ERYTHROCYTE [DISTWIDTH] IN BLOOD BY AUTOMATED COUNT: 14.7 % (ref 12.3–15.4)
GLOBULIN UR ELPH-MCNC: 2.3 GM/DL
GLUCOSE SERPL-MCNC: 89 MG/DL (ref 65–99)
HCT VFR BLD AUTO: 44.1 % (ref 34–46.6)
HGB BLD-MCNC: 14.3 G/DL (ref 12–15.9)
LYMPHOCYTES # BLD AUTO: 13.26 10*3/MM3 (ref 0.7–3.1)
LYMPHOCYTES NFR BLD AUTO: 68.3 % (ref 19.6–45.3)
MCH RBC QN AUTO: 29.8 PG (ref 26.6–33)
MCHC RBC AUTO-ENTMCNC: 32.4 G/DL (ref 31.5–35.7)
MCV RBC AUTO: 91.9 FL (ref 79–97)
MONOCYTES # BLD AUTO: 1.14 10*3/MM3 (ref 0.1–0.9)
MONOCYTES NFR BLD AUTO: 5.9 % (ref 5–12)
NEUTROPHILS NFR BLD AUTO: 23.6 % (ref 42.7–76)
NEUTROPHILS NFR BLD AUTO: 4.59 10*3/MM3 (ref 1.7–7)
PLATELET # BLD AUTO: 188 10*3/MM3 (ref 140–450)
PMV BLD AUTO: 10.2 FL (ref 6–12)
POTASSIUM SERPL-SCNC: 5.1 MMOL/L (ref 3.5–5.2)
PROT SERPL-MCNC: 6.8 G/DL (ref 6–8.5)
RBC # BLD AUTO: 4.8 10*6/MM3 (ref 3.77–5.28)
SODIUM SERPL-SCNC: 141 MMOL/L (ref 136–145)
WBC NRBC COR # BLD AUTO: 19.41 10*3/MM3 (ref 3.4–10.8)

## 2024-02-07 PROCEDURE — 85025 COMPLETE CBC W/AUTO DIFF WBC: CPT

## 2024-02-07 PROCEDURE — 80053 COMPREHEN METABOLIC PANEL: CPT | Performed by: INTERNAL MEDICINE

## 2024-02-07 PROCEDURE — 36415 COLL VENOUS BLD VENIPUNCTURE: CPT

## 2024-02-07 RX ORDER — MELOXICAM 15 MG/1
1 TABLET ORAL DAILY
COMMUNITY
Start: 2024-01-10

## 2024-02-07 NOTE — TELEPHONE ENCOUNTER
[facility] received a fax that requires your attention. The document has been indexed to the patient’s chart for your review.      Reason for sending: RECEIVED MEDICAL RECORDS FOR NEW PATIENT    Documents Description: FLOWSHEET 10/27/23, INSURANCE CARD 02/07/24, DEMO 02/07/24, PHYS NOTE 01/31/23, 03/20/23, 02/23/23, PATIENT HISTORY 02/07/24, MEDICAL RELEASE 04/04/23, LAB 10/27/23, 07/24/23, 04/25/23, 02/23/23, 03/20/23, 01/24/23, 12/22/22, 07/28/21, AB/PEL CT 04/08/23, CHEST XRAY 03/06/23, KNEE MRI 10/04/22, RENAL ULTRASOUND 09/22/22, JOAQUIM,JULIANNE MRI 09/07/22, CHEST CT 02/01/22, MAMMO 01/11/22.> INDEXED IN CHART    Name of Sender: BOB SMITH MD    Date Indexed: 02/07/24    Notes (if needed): THANKS!

## 2024-02-08 ENCOUNTER — PATIENT ROUNDING (BHMG ONLY) (OUTPATIENT)
Dept: ONCOLOGY | Facility: CLINIC | Age: 59
End: 2024-02-08
Payer: COMMERCIAL

## 2024-02-08 NOTE — PROGRESS NOTES
February 8, 2024    Hello, may I speak with Tatyana Moseley?    My name is Anisa Douglass      I am  with [facility]  [address] [place] [address] [phone].    Before we get started may I verify your date of birth? 1965    I am calling to officially welcome you to our practice and ask about your recent visit. Is this a good time to talk? no    Tell me about your visit with us. What things went well?  A My Chart message was sent to the patient.       We're always looking for ways to make our patients' experiences even better. Do you have recommendations on ways we may improve?  no    Overall were you satisfied with your first visit to our practice? yes       I appreciate you taking the time to speak with me today. Is there anything else I can do for you? no      Thank you, and have a great day.

## 2024-02-12 LAB
CELLS ANALYZED: NORMAL
CELLS COUNTED: NORMAL
CLINICAL CYTOGENETICIST SPEC: NORMAL
GENETIC DISEASES BLD/T FISH: NORMAL
NARRATIVE DIAGNOSTIC REPORT-IMP: NORMAL
SPECIMEN SOURCE: NORMAL

## 2024-05-07 ENCOUNTER — TELEPHONE (OUTPATIENT)
Dept: ONCOLOGY | Facility: CLINIC | Age: 59
End: 2024-05-07
Payer: COMMERCIAL

## 2024-05-10 ENCOUNTER — OFFICE VISIT (OUTPATIENT)
Dept: ONCOLOGY | Facility: CLINIC | Age: 59
End: 2024-05-10
Payer: COMMERCIAL

## 2024-05-10 ENCOUNTER — LAB (OUTPATIENT)
Dept: LAB | Facility: HOSPITAL | Age: 59
End: 2024-05-10
Payer: COMMERCIAL

## 2024-05-10 VITALS
BODY MASS INDEX: 33.25 KG/M2 | SYSTOLIC BLOOD PRESSURE: 139 MMHG | DIASTOLIC BLOOD PRESSURE: 81 MMHG | OXYGEN SATURATION: 100 % | HEART RATE: 61 BPM | TEMPERATURE: 97.7 F | HEIGHT: 68 IN | WEIGHT: 219.4 LBS

## 2024-05-10 DIAGNOSIS — C91.10 CLL (CHRONIC LYMPHOCYTIC LEUKEMIA): Primary | ICD-10-CM

## 2024-05-10 LAB
BASOPHILS # BLD AUTO: 0.02 10*3/MM3 (ref 0–0.2)
BASOPHILS NFR BLD AUTO: 0.1 % (ref 0–1.5)
DEPRECATED RDW RBC AUTO: 45.1 FL (ref 37–54)
EOSINOPHIL # BLD AUTO: 0.06 10*3/MM3 (ref 0–0.4)
EOSINOPHIL NFR BLD AUTO: 0.2 % (ref 0.3–6.2)
ERYTHROCYTE [DISTWIDTH] IN BLOOD BY AUTOMATED COUNT: 13.7 % (ref 12.3–15.4)
HCT VFR BLD AUTO: 42.8 % (ref 34–46.6)
HGB BLD-MCNC: 13.7 G/DL (ref 12–15.9)
HOLD SPECIMEN: NORMAL
HOLD SPECIMEN: NORMAL
LYMPHOCYTES # BLD AUTO: 18.49 10*3/MM3 (ref 0.7–3.1)
LYMPHOCYTES NFR BLD AUTO: 71.5 % (ref 19.6–45.3)
MCH RBC QN AUTO: 29.7 PG (ref 26.6–33)
MCHC RBC AUTO-ENTMCNC: 32 G/DL (ref 31.5–35.7)
MCV RBC AUTO: 92.8 FL (ref 79–97)
MONOCYTES # BLD AUTO: 1.01 10*3/MM3 (ref 0.1–0.9)
MONOCYTES NFR BLD AUTO: 3.9 % (ref 5–12)
NEUTROPHILS NFR BLD AUTO: 24.3 % (ref 42.7–76)
NEUTROPHILS NFR BLD AUTO: 6.28 10*3/MM3 (ref 1.7–7)
PLATELET # BLD AUTO: 257 10*3/MM3 (ref 140–450)
PMV BLD AUTO: 9.7 FL (ref 6–12)
RBC # BLD AUTO: 4.61 10*6/MM3 (ref 3.77–5.28)
WBC NRBC COR # BLD AUTO: 25.86 10*3/MM3 (ref 3.4–10.8)

## 2024-05-10 PROCEDURE — 36415 COLL VENOUS BLD VENIPUNCTURE: CPT

## 2024-05-10 PROCEDURE — 85025 COMPLETE CBC W/AUTO DIFF WBC: CPT

## 2024-05-10 RX ORDER — PREDNISONE 5 MG/1
TABLET ORAL
COMMUNITY
Start: 2024-05-01

## 2024-05-28 ENCOUNTER — TELEPHONE (OUTPATIENT)
Dept: ONCOLOGY | Facility: CLINIC | Age: 59
End: 2024-05-28
Payer: COMMERCIAL

## 2024-05-28 NOTE — TELEPHONE ENCOUNTER
Caller: CANDIE      Best call back number: [phone]      Who are you requesting to speak with (clinical staff, provider,  specific staff member):       What was the call regarding: CANDIE FROM [facility] CALLED REGARDING PT SURGERY SCHEDULED FOR 6/11.  PT IS AWAITING SURGICAL CLEARANCE.  PLEASE FAX ASAP -[phone]

## 2024-05-28 NOTE — TELEPHONE ENCOUNTER
EVA SCHREIBER, SHE INDICATES SOMEONE CONFIRMED CLEARANCE SENT ON 05/21 WAS RECEIVED HERE.  UNABLE TO LOCATE CLEARANCE.  CALLED BACK AND SPOKE WITH ELAYNE.  ASKED HER TO RE-SEND TO OUR MAIN FAX NUMBER.

## 2024-07-22 ENCOUNTER — LAB (OUTPATIENT)
Dept: LAB | Facility: HOSPITAL | Age: 59
End: 2024-07-22
Payer: COMMERCIAL

## 2024-07-22 ENCOUNTER — TRANSCRIBE ORDERS (OUTPATIENT)
Dept: ADMINISTRATIVE | Facility: HOSPITAL | Age: 59
End: 2024-07-22
Payer: COMMERCIAL

## 2024-07-22 ENCOUNTER — HOSPITAL ENCOUNTER (OUTPATIENT)
Dept: CARDIOLOGY | Facility: HOSPITAL | Age: 59
Discharge: HOME | End: 2024-07-22
Payer: COMMERCIAL

## 2024-07-22 DIAGNOSIS — Z01.818 OTHER SPECIFIED PRE-OPERATIVE EXAMINATION: ICD-10-CM

## 2024-07-22 DIAGNOSIS — Z01.818 OTHER SPECIFIED PRE-OPERATIVE EXAMINATION: Primary | ICD-10-CM

## 2024-07-22 DIAGNOSIS — C91.10 CLL (CHRONIC LYMPHOCYTIC LEUKEMIA): ICD-10-CM

## 2024-07-22 LAB
ANION GAP SERPL CALCULATED.3IONS-SCNC: 8.4 MMOL/L (ref 5–15)
BLASTS NFR BLD MANUAL: 4 % (ref 0–0)
BUN SERPL-MCNC: 17 MG/DL (ref 8–23)
BUN/CREAT SERPL: 27.4 (ref 7–25)
CALCIUM SPEC-SCNC: 9.3 MG/DL (ref 8.6–10.5)
CHLORIDE SERPL-SCNC: 104 MMOL/L (ref 98–107)
CO2 SERPL-SCNC: 27.6 MMOL/L (ref 22–29)
CREAT SERPL-MCNC: 0.62 MG/DL (ref 0.57–1)
DEPRECATED RDW RBC AUTO: 45.8 FL (ref 37–54)
EGFRCR SERPLBLD CKD-EPI 2021: 101.5 ML/MIN/1.73
ERYTHROCYTE [DISTWIDTH] IN BLOOD BY AUTOMATED COUNT: 13.5 % (ref 12.3–15.4)
GLUCOSE SERPL-MCNC: 83 MG/DL (ref 65–99)
HCT VFR BLD AUTO: 41.9 % (ref 34–46.6)
HGB BLD-MCNC: 13.1 G/DL (ref 12–15.9)
LYMPHOCYTES # BLD MANUAL: 13.71 10*3/MM3 (ref 0.7–3.1)
LYMPHOCYTES NFR BLD MANUAL: 1 % (ref 5–12)
MCH RBC QN AUTO: 28.7 PG (ref 26.6–33)
MCHC RBC AUTO-ENTMCNC: 31.3 G/DL (ref 31.5–35.7)
MCV RBC AUTO: 91.7 FL (ref 79–97)
MONOCYTES # BLD: 0.19 10*3/MM3 (ref 0.1–0.9)
NEUTROPHILS # BLD AUTO: 4.19 10*3/MM3 (ref 1.7–7)
NEUTROPHILS NFR BLD MANUAL: 22 % (ref 42.7–76)
PATHOLOGY REVIEW: YES
PLAT MORPH BLD: NORMAL
PLATELET # BLD AUTO: 236 10*3/MM3 (ref 140–450)
PMV BLD AUTO: 10.2 FL (ref 6–12)
POTASSIUM SERPL-SCNC: 4.5 MMOL/L (ref 3.5–5.2)
PROLYMPHOCYTES NFR BLD MANUAL: 1 % (ref 0–0)
QT INTERVAL: 418 MS
QTC INTERVAL: 428 MS
RBC # BLD AUTO: 4.57 10*6/MM3 (ref 3.77–5.28)
SCAN SLIDE: NORMAL
SMUDGE CELLS BLD QL SMEAR: ABNORMAL
SODIUM SERPL-SCNC: 140 MMOL/L (ref 136–145)
STOMATOCYTES BLD QL SMEAR: ABNORMAL
VARIANT LYMPHS NFR BLD MANUAL: 4 % (ref 0–5)
VARIANT LYMPHS NFR BLD MANUAL: 68 % (ref 19.6–45.3)
WBC NRBC COR # BLD AUTO: 19.04 10*3/MM3 (ref 3.4–10.8)

## 2024-07-22 PROCEDURE — 85007 BL SMEAR W/DIFF WBC COUNT: CPT

## 2024-07-22 PROCEDURE — 80048 BASIC METABOLIC PNL TOTAL CA: CPT

## 2024-07-22 PROCEDURE — 85025 COMPLETE CBC W/AUTO DIFF WBC: CPT

## 2024-07-22 PROCEDURE — 93010 ELECTROCARDIOGRAM REPORT: CPT | Performed by: INTERNAL MEDICINE

## 2024-07-22 PROCEDURE — 93005 ELECTROCARDIOGRAM TRACING: CPT | Performed by: ORTHOPAEDIC SURGERY

## 2024-07-22 PROCEDURE — 36415 COLL VENOUS BLD VENIPUNCTURE: CPT

## 2024-07-23 LAB
LAB AP CASE REPORT: NORMAL
PATH REPORT.FINAL DX SPEC: NORMAL

## 2024-09-06 NOTE — PROGRESS NOTES
HEMATOLOGY ONCOLOGY OUTPATIENT FOLLOW-UP       Patient name: Tatyana Moseley  : 1965  MRN: [id number]  Primary Care Physician: Pietro Rahman NP  Referring Physician: No ref. provider found  Reason For Consult: Chronic lymphocytic leukemia.    History of Present Illness:  Patient is a 59 y.o.     2024: In the office for the first time to transfer care.   first came to attention between  and  when she was found to have elevated white cell count with lymphocytosis on routine blood work.  Initially her white cell count was around 13,200/mm³ with 63% lymphocytes.  She did not have anemia or thrombocytopenia.  She carried a history of rheumatoid arthritis and intermittently was treated with steroids.  Eventually she underwent lumbar vertebral fusion in late .  Early in  she had a sample of blood analyzed by flow cytometry and again B-cell lymphocytosis, consistent with chronic lymphocytic leukemia, was reported.  Given her lack of symptoms and the lack of obvious progression of the condition observation was chosen.  She is in the office for initial evaluation reporting no symptoms.  She recently underwent total knee arthroplasty on the right and received steroids for this.  She is recovering well and has had no problems.  She has remained free of fevers, nocturnal diaphoresis or unintended weight loss.  She denies respiratory symptoms and has had no digestive problems.  She continues to experience arthralgia of multiple articulations.  On exam she is a well-built woman who appears chronically ill but does not seem in any distress.  She is neither jaundiced nor pale.  The lungs are clear and the heart is regular.  The abdomen is soft and nontender.  No edema.  The laboratory exams were reviewed.  No need for intervention at this point.  She is to have a FISH panel to determine prognosis on a sample of blood today and will see me to review the results in  approximately 4 months.    5/10/2024: Entirely asymptomatic.  Continues to have some back pain.  Also has some lower abdominal pain.  Generally energetic and with good appetite.  No weight loss.  Has been afebrile and with occasional nocturnal diaphoresis.  No chest pains and no dyspnea.  On exam she appears chronically ill.  She is in no distress.  She is neither jaundiced nor pale.  The lungs are clear bilaterally and the heart is regular.  The abdomen is protuberant and rounded.  It is soft and nontender and no hepatomegaly or splenomegaly are identified.  The laboratory exams reveal a white blood cell count of 25,860/mm³ with large predominance of lymphocytes.  No neutropenia.  Hemoglobin and platelets are also within the normal range.  The FISH panel for prognostic did not reveal any major cytogenetic abnormalities and in particular it did not reveal adverse prognostic markers.  Will continue to monitor periodically and I have asked her to see me in approximately 4 months.  Discussed with her the findings.    9/11/2024: Feels well. Recently had a laminectomy and surgery to the left foot. Otherwise without problems. She has continued to have nocturnal diaphoresis but it is not worse than usual. No fevers. No unintended weight loss. Denies chest pain or cough. No abdominal pain or diarrhea and no dysuria. No edema. No skin rash. On exam alert and conversant. No distress and no jaundice. No oral lesions and no  palpable adenopathy. Lungs are clear and heart regular. Abdomen protuberant and soft; the liver and spleen not enlarged. No edema. The laboratory exams were reviewed and discussed with her and her spouse. To see me in 4 months.     Past Medical History:   Diagnosis Date    Allergic     Nickel, Cipro, Latex    Anxiety     Chest pain 12/17/2021    Disease of thyroid gland     Emphysema of lung 02/02/2022    Diagnosed from CT Scan.  Seeing a pulmonologist in May.    GERD (gastroesophageal reflux disease)      Hyperlipidemia     Hypertension     Insomnia     Myocardial infarction 10/06/1986    Sleep apnea Oct, 2022    Urinary tract infection     I’ve had many; doing better since I started supplements,     Past Surgical History:   Procedure Laterality Date    APPENDECTOMY      CARDIAC CATHETERIZATION  2006     SECTION      CYST REMOVAL      FOOT ARTHROPLASTY      HYSTERECTOMY      REPLACEMENT TOTAL KNEE  01/10/2024       Current Outpatient Medications:     Cranberry 1000 MG capsule, , Disp: , Rfl:     DULoxetine (CYMBALTA) 30 MG capsule, Take 2 capsules by mouth Daily., Disp: , Rfl:     eszopiclone (LUNESTA) 2 MG tablet, TAKE ONE (1) TABLET BY MOUTH EVERY NIGHT AT BEDTIME, Disp: , Rfl:     hydroxychloroquine (PLAQUENIL) 200 MG tablet, TAKE TWO (2) TABLETS BY MOUTH EVERY DAY, Disp: , Rfl:     levothyroxine (SYNTHROID, LEVOTHROID) 137 MCG tablet, TAKE ONE (1) TABLET BY MOUTH EVERY DAY, Disp: , Rfl:     losartan (COZAAR) 25 MG tablet, losartan 25 mg tablet  TAKE ONE (1) TABLET BY MOUTH EVERY DAY, Disp: , Rfl:     pantoprazole (PROTONIX) 40 MG EC tablet, TAKE ONE (1) TABLET BY MOUTH EVERY DAY, Disp: , Rfl:     simvastatin (ZOCOR) 40 MG tablet, simvastatin 40 mg tablet  TAKE ONE (1) TABLET BY MOUTH EVERY DAY, Disp: , Rfl:     Allergies   Allergen Reactions    Nickel Hives    Latex Nausea Only    Ciprofloxacin Itching and Rash     Family History   Problem Relation Age of Onset    Heart disease Mother     Hypertension Mother     Leukemia Mother     Heart disease Father     Hypertension Father     Prostate cancer Father 74     Cancer-related family history includes Prostate cancer (age of onset: 74) in her father.    Social History     Tobacco Use    Smoking status: Never     Passive exposure: Never    Smokeless tobacco: Never   Vaping Use    Vaping status: Never Used   Substance Use Topics    Alcohol use: Yes     Comment: Maybe one craft beer a month.    Drug use: Never     Social History     Social History Narrative     "Not on file     ROS:   Review of Systems   Constitutional:  Negative for activity change, appetite change, chills, diaphoresis, fatigue, fever and unexpected weight change.   HENT:  Negative for congestion, dental problem, drooling, ear discharge, ear pain, facial swelling, hearing loss, mouth sores, nosebleeds, postnasal drip, rhinorrhea, sinus pressure, sinus pain, sneezing, sore throat, tinnitus, trouble swallowing and voice change.    Eyes:  Negative for photophobia, pain, discharge, redness, itching and visual disturbance.   Respiratory:  Negative for apnea, cough, choking, chest tightness, shortness of breath, wheezing and stridor.    Cardiovascular:  Negative for chest pain, palpitations and leg swelling.   Gastrointestinal:  Negative for abdominal distention, abdominal pain, anal bleeding, blood in stool, constipation, diarrhea, nausea, rectal pain and vomiting.   Endocrine: Negative for cold intolerance, heat intolerance, polydipsia and polyuria.   Genitourinary:  Negative for decreased urine volume, difficulty urinating, dysuria, flank pain, frequency, genital sores, hematuria and urgency.   Musculoskeletal:  Negative for arthralgias, back pain, gait problem, joint swelling, myalgias, neck pain and neck stiffness.   Skin:  Negative for color change, pallor and rash.   Neurological:  Negative for dizziness, tremors, seizures, syncope, facial asymmetry, speech difficulty, weakness, light-headedness, numbness and headaches.   Hematological:  Negative for adenopathy. Does not bruise/bleed easily.   Psychiatric/Behavioral:  Negative for agitation, behavioral problems, confusion, decreased concentration, hallucinations, self-injury, sleep disturbance and suicidal ideas. The patient is not nervous/anxious.      Objective:    Vital Signs:  Vitals:    09/11/24 1140   BP: 139/82   Pulse: 67   Resp: 18   Temp: 97.8 °F (36.6 °C)   SpO2: 97%   Weight: 103 kg (226 lb 3.2 oz)   Height: 170.2 cm (67\")   PainSc: 0-No pain "     Body mass index is 35.43 kg/m².    ECOG  (0) Fully active, able to carry on all predisease performance without restriction    Physical Exam:   Physical Exam  Constitutional:       General: She is not in acute distress.     Appearance: She is not ill-appearing, toxic-appearing or diaphoretic.      Comments: Well-built and well-oriented woman who does not seem in distress.  She is conversant and oriented.  BMI greater than 34 kg/m².   HENT:      Head: Normocephalic and atraumatic.      Right Ear: External ear normal.      Left Ear: External ear normal.      Nose: Nose normal.      Mouth/Throat:      Mouth: Mucous membranes are moist.      Pharynx: Oropharynx is clear. No oropharyngeal exudate or posterior oropharyngeal erythema.   Eyes:      General: No scleral icterus.        Right eye: No discharge.         Left eye: No discharge.      Conjunctiva/sclera: Conjunctivae normal.      Pupils: Pupils are equal, round, and reactive to light.   Cardiovascular:      Rate and Rhythm: Normal rate and regular rhythm.      Pulses: Normal pulses.      Heart sounds: No murmur heard.     No friction rub. No gallop.   Pulmonary:      Effort: No respiratory distress.      Breath sounds: No stridor. No wheezing, rhonchi or rales.   Abdominal:      General: Bowel sounds are normal. There is no distension.      Palpations: Abdomen is soft. There is no mass.      Tenderness: There is no abdominal tenderness. There is no right CVA tenderness, left CVA tenderness, guarding or rebound.      Hernia: No hernia is present.      Comments: Protuberant and rounded.  Soft and nontender.  No hepatomegaly or splenomegaly.   Musculoskeletal:         General: No tenderness, deformity or signs of injury.      Cervical back: No rigidity.      Right lower leg: No edema.      Left lower leg: No edema.   Lymphadenopathy:      Cervical: No cervical adenopathy.   Skin:     Coloration: Skin is not jaundiced.      Findings: No bruising, lesion or rash.    Neurological:      General: No focal deficit present.      Mental Status: She is alert and oriented to person, place, and time.      Cranial Nerves: No cranial nerve deficit.      Motor: No weakness.      Gait: Gait normal.   Psychiatric:         Mood and Affect: Mood normal.         Behavior: Behavior normal.         Thought Content: Thought content normal.         Judgment: Judgment normal.     LONG Chávez MD performed the physical exam on 9/11/2024 as documented above.     Lab Results - Last 18 Months   Lab Units 09/11/24  1134 07/22/24  1120 05/10/24  0919   WBC 10*3/mm3 17.37* 19.04* 25.86*   HEMOGLOBIN g/dL 13.6 13.1 13.7   HEMATOCRIT % 42.8 41.9 42.8   PLATELETS 10*3/mm3 230 236 257   MCV fL 92.2 91.7 92.8     Lab Results - Last 18 Months   Lab Units 07/22/24  1120 02/07/24  0945 12/18/23  1238   SODIUM mmol/L 140 141 143   POTASSIUM mmol/L 4.5 5.1 4.8   CHLORIDE mmol/L 104 104 106   CO2 mmol/L 27.6 30.0* 28.0   BUN mg/dL 17 20 18   CREATININE mg/dL 0.62 0.70 0.67   CALCIUM mg/dL 9.3 10.1 9.4   BILIRUBIN mg/dL  --  0.3  --    ALK PHOS U/L  --  70  --    ALT (SGPT) U/L  --  24  --    AST (SGOT) U/L  --  19  --    GLUCOSE mg/dL 83 89 71     Lab Results   Component Value Date    GLUCOSE 83 07/22/2024    BUN 17 07/22/2024    CREATININE 0.62 07/22/2024    EGFRIFNONA 90 12/17/2021    BCR 27.4 (H) 07/22/2024    K 4.5 07/22/2024    CO2 27.6 07/22/2024    CALCIUM 9.3 07/22/2024    ALBUMIN 4.5 02/07/2024    AST 19 02/07/2024    ALT 24 02/07/2024     Assessment & Plan     1.  Chronic lymphocytic leukemia stage 0.  Remains completely asymptomatic. The laboratory exams reveal no changes. To continue observation.   2.  Rheumatoid arthritis  3.  Reviewed the laboratory exams. Discussed with her and her spouse the results.   4.  She is to see me in approximately 4 months.     Marc Kyler MD on 9/11/2024 at 12:34 PM.

## 2024-09-11 ENCOUNTER — OFFICE VISIT (OUTPATIENT)
Dept: ONCOLOGY | Facility: CLINIC | Age: 59
End: 2024-09-11
Payer: COMMERCIAL

## 2024-09-11 ENCOUNTER — LAB (OUTPATIENT)
Dept: LAB | Facility: HOSPITAL | Age: 59
End: 2024-09-11
Payer: COMMERCIAL

## 2024-09-11 VITALS
RESPIRATION RATE: 18 BRPM | TEMPERATURE: 97.8 F | HEIGHT: 67 IN | OXYGEN SATURATION: 97 % | HEART RATE: 67 BPM | SYSTOLIC BLOOD PRESSURE: 139 MMHG | WEIGHT: 226.2 LBS | BODY MASS INDEX: 35.5 KG/M2 | DIASTOLIC BLOOD PRESSURE: 82 MMHG

## 2024-09-11 DIAGNOSIS — C91.10 CLL (CHRONIC LYMPHOCYTIC LEUKEMIA): Primary | ICD-10-CM

## 2024-09-11 LAB
BASOPHILS # BLD AUTO: 0.02 10*3/MM3 (ref 0–0.2)
BASOPHILS NFR BLD AUTO: 0.1 % (ref 0–1.5)
DEPRECATED RDW RBC AUTO: 46 FL (ref 37–54)
EOSINOPHIL # BLD AUTO: 0.08 10*3/MM3 (ref 0–0.4)
EOSINOPHIL NFR BLD AUTO: 0.5 % (ref 0.3–6.2)
ERYTHROCYTE [DISTWIDTH] IN BLOOD BY AUTOMATED COUNT: 14 % (ref 12.3–15.4)
HCT VFR BLD AUTO: 42.8 % (ref 34–46.6)
HGB BLD-MCNC: 13.6 G/DL (ref 12–15.9)
HOLD SPECIMEN: NORMAL
HOLD SPECIMEN: NORMAL
LYMPHOCYTES # BLD AUTO: 13.74 10*3/MM3 (ref 0.7–3.1)
LYMPHOCYTES NFR BLD AUTO: 79.1 % (ref 19.6–45.3)
MCH RBC QN AUTO: 29.3 PG (ref 26.6–33)
MCHC RBC AUTO-ENTMCNC: 31.8 G/DL (ref 31.5–35.7)
MCV RBC AUTO: 92.2 FL (ref 79–97)
MONOCYTES # BLD AUTO: 0.43 10*3/MM3 (ref 0.1–0.9)
MONOCYTES NFR BLD AUTO: 2.5 % (ref 5–12)
NEUTROPHILS NFR BLD AUTO: 17.8 % (ref 42.7–76)
NEUTROPHILS NFR BLD AUTO: 3.1 10*3/MM3 (ref 1.7–7)
PLATELET # BLD AUTO: 230 10*3/MM3 (ref 140–450)
PMV BLD AUTO: 10.2 FL (ref 6–12)
RBC # BLD AUTO: 4.64 10*6/MM3 (ref 3.77–5.28)
WBC NRBC COR # BLD AUTO: 17.37 10*3/MM3 (ref 3.4–10.8)

## 2024-09-11 PROCEDURE — 99213 OFFICE O/P EST LOW 20 MIN: CPT | Performed by: INTERNAL MEDICINE

## 2024-09-11 PROCEDURE — 85025 COMPLETE CBC W/AUTO DIFF WBC: CPT | Performed by: INTERNAL MEDICINE

## 2024-09-11 PROCEDURE — 36415 COLL VENOUS BLD VENIPUNCTURE: CPT

## 2024-10-18 ENCOUNTER — HOSPITAL ENCOUNTER (OUTPATIENT)
Dept: CARDIOLOGY | Facility: HOSPITAL | Age: 59
Discharge: HOME | End: 2024-10-18
Payer: COMMERCIAL

## 2024-10-18 ENCOUNTER — TRANSCRIBE ORDERS (OUTPATIENT)
Dept: ADMINISTRATIVE | Facility: HOSPITAL | Age: 59
End: 2024-10-18
Payer: COMMERCIAL

## 2024-10-18 ENCOUNTER — LAB (OUTPATIENT)
Dept: LAB | Facility: HOSPITAL | Age: 59
End: 2024-10-18
Payer: COMMERCIAL

## 2024-10-18 DIAGNOSIS — Z01.818 PRE-OP TESTING: ICD-10-CM

## 2024-10-18 DIAGNOSIS — Z01.818 PRE-OP TESTING: Primary | ICD-10-CM

## 2024-10-18 LAB
ALBUMIN SERPL-MCNC: 4.3 G/DL (ref 3.5–5.2)
ANION GAP SERPL CALCULATED.3IONS-SCNC: 7.4 MMOL/L (ref 5–15)
BUN SERPL-MCNC: 21 MG/DL (ref 8–23)
BUN/CREAT SERPL: 29.2 (ref 7–25)
CALCIUM SPEC-SCNC: 9.5 MG/DL (ref 8.6–10.5)
CHLORIDE SERPL-SCNC: 104 MMOL/L (ref 98–107)
CO2 SERPL-SCNC: 28.6 MMOL/L (ref 22–29)
CREAT SERPL-MCNC: 0.72 MG/DL (ref 0.57–1)
DEPRECATED RDW RBC AUTO: 43.7 FL (ref 37–54)
EGFRCR SERPLBLD CKD-EPI 2021: 95.3 ML/MIN/1.73
EOSINOPHIL # BLD MANUAL: 0.19 10*3/MM3 (ref 0–0.4)
EOSINOPHIL NFR BLD MANUAL: 1 % (ref 0.3–6.2)
ERYTHROCYTE [DISTWIDTH] IN BLOOD BY AUTOMATED COUNT: 13.2 % (ref 12.3–15.4)
GLUCOSE SERPL-MCNC: 79 MG/DL (ref 65–99)
HBA1C MFR BLD: 5.32 % (ref 4.8–5.6)
HCT VFR BLD AUTO: 40.2 % (ref 34–46.6)
HGB BLD-MCNC: 12.7 G/DL (ref 12–15.9)
LYMPHOCYTES # BLD MANUAL: 12.52 10*3/MM3 (ref 0.7–3.1)
LYMPHOCYTES NFR BLD MANUAL: 3 % (ref 5–12)
MCH RBC QN AUTO: 28.7 PG (ref 26.6–33)
MCHC RBC AUTO-ENTMCNC: 31.6 G/DL (ref 31.5–35.7)
MCV RBC AUTO: 90.7 FL (ref 79–97)
MONOCYTES # BLD: 0.56 10*3/MM3 (ref 0.1–0.9)
MRSA DNA SPEC QL NAA+PROBE: NORMAL
NEUTROPHILS # BLD AUTO: 5.42 10*3/MM3 (ref 1.7–7)
NEUTROPHILS NFR BLD MANUAL: 29 % (ref 42.7–76)
PATHOLOGY REVIEW: YES
PLAT MORPH BLD: NORMAL
PLATELET # BLD AUTO: 244 10*3/MM3 (ref 140–450)
PMV BLD AUTO: 10.6 FL (ref 6–12)
POTASSIUM SERPL-SCNC: 4 MMOL/L (ref 3.5–5.2)
RBC # BLD AUTO: 4.43 10*6/MM3 (ref 3.77–5.28)
RBC MORPH BLD: NORMAL
SCAN SLIDE: NORMAL
SMUDGE CELLS BLD QL SMEAR: ABNORMAL
SODIUM SERPL-SCNC: 140 MMOL/L (ref 136–145)
VARIANT LYMPHS NFR BLD MANUAL: 5 % (ref 0–5)
VARIANT LYMPHS NFR BLD MANUAL: 62 % (ref 19.6–45.3)
WBC NRBC COR # BLD AUTO: 18.68 10*3/MM3 (ref 3.4–10.8)

## 2024-10-18 PROCEDURE — 80048 BASIC METABOLIC PNL TOTAL CA: CPT

## 2024-10-18 PROCEDURE — 36415 COLL VENOUS BLD VENIPUNCTURE: CPT

## 2024-10-18 PROCEDURE — 93005 ELECTROCARDIOGRAM TRACING: CPT | Performed by: ORTHOPAEDIC SURGERY

## 2024-10-18 PROCEDURE — 87641 MR-STAPH DNA AMP PROBE: CPT

## 2024-10-18 PROCEDURE — 83036 HEMOGLOBIN GLYCOSYLATED A1C: CPT

## 2024-10-18 PROCEDURE — 85025 COMPLETE CBC W/AUTO DIFF WBC: CPT

## 2024-10-18 PROCEDURE — 85007 BL SMEAR W/DIFF WBC COUNT: CPT

## 2024-10-18 PROCEDURE — 82040 ASSAY OF SERUM ALBUMIN: CPT

## 2024-10-19 LAB
QT INTERVAL: 399 MS
QTC INTERVAL: 432 MS

## 2024-10-21 LAB
LAB AP CASE REPORT: NORMAL
PATH REPORT.FINAL DX SPEC: NORMAL

## 2024-10-28 ENCOUNTER — TELEPHONE (OUTPATIENT)
Dept: CARDIOLOGY | Facility: CLINIC | Age: 59
End: 2024-10-28
Payer: COMMERCIAL

## 2024-10-29 NOTE — TELEPHONE ENCOUNTER
Patient is cleared clearance has been scanned to chart and called Ike she asked could we scan to pt chart in epic it has been done.

## 2024-10-29 NOTE — TELEPHONE ENCOUNTER
SHARDA WITH DR. WALTON'S OFFICE [phone]    NEEDS TO KNOW IF PATIENT IS CLEARED. THEY NEED TO KNOW ASAP, WITHIN THE NEXT HOUR.

## 2025-01-03 NOTE — PROGRESS NOTES
HEMATOLOGY ONCOLOGY OUTPATIENT FOLLOW-UP       Patient name: Tatyana Moseley  : 1965  MRN: [id number]  Primary Care Physician: Pietro Rahman NP  Referring Physician: No ref. provider found  Reason For Consult: Chronic lymphocytic leukemia.    History of Present Illness:    2024: In the office for the first time to transfer care.   first came to attention between  and  when she was found to have elevated white cell count with lymphocytosis on routine blood work.  Initially her white cell count was around 13,200/mm³ with 63% lymphocytes.  She did not have anemia or thrombocytopenia.  She carried a history of rheumatoid arthritis and intermittently was treated with steroids.  Eventually she underwent lumbar vertebral fusion in late .  Early in  she had a sample of blood analyzed by flow cytometry and again B-cell lymphocytosis, consistent with chronic lymphocytic leukemia, was reported.  Given her lack of symptoms and the lack of obvious progression of the condition observation was chosen.  She is in the office for initial evaluation reporting no symptoms.  She recently underwent total knee arthroplasty on the right and received steroids for this.  She is recovering well and has had no problems.  She has remained free of fevers, nocturnal diaphoresis or unintended weight loss.  She denies respiratory symptoms and has had no digestive problems.  She continues to experience arthralgia of multiple articulations.  On exam she is a well-built woman who appears chronically ill but does not seem in any distress.  She is neither jaundiced nor pale.  The lungs are clear and the heart is regular.  The abdomen is soft and nontender.  No edema.  The laboratory exams were reviewed.  No need for intervention at this point.  She is to have a FISH panel to determine prognosis on a sample of blood today and will see me to review the results in approximately 4  months.    5/10/2024: Entirely asymptomatic.  Continues to have some back pain.  Also has some lower abdominal pain.  Generally energetic and with good appetite.  No weight loss.  Has been afebrile and with occasional nocturnal diaphoresis.  No chest pains and no dyspnea.  On exam she appears chronically ill.  She is in no distress.  She is neither jaundiced nor pale.  The lungs are clear bilaterally and the heart is regular.  The abdomen is protuberant and rounded.  It is soft and nontender and no hepatomegaly or splenomegaly are identified.  The laboratory exams reveal a white blood cell count of 25,860/mm³ with large predominance of lymphocytes.  No neutropenia.  Hemoglobin and platelets are also within the normal range.  The FISH panel for prognostic did not reveal any major cytogenetic abnormalities and in particular it did not reveal adverse prognostic markers.  Will continue to monitor periodically and I have asked her to see me in approximately 4 months.  Discussed with her the findings.    9/11/2024: Feels well. Recently had a laminectomy and surgery to the left foot. Otherwise without problems. She has continued to have nocturnal diaphoresis but it is not worse than usual. No fevers. No unintended weight loss. Denies chest pain or cough. No abdominal pain or diarrhea and no dysuria. No edema. No skin rash. On exam alert and conversant. No distress and no jaundice. No oral lesions and no  palpable adenopathy. Lungs are clear and heart regular. Abdomen protuberant and soft; the liver and spleen not enlarged. No edema. The laboratory exams were reviewed and discussed with her and her spouse. To see me in 4 months.     1/9/2025: Feeling well.  Underwent hip arthroplasty on the right without any complications but with some difficulties recovering.  She had some edema of the right lower extremity following that which is now resolved.  She has been afebrile.  No nocturnal diaphoresis and no unintended weight  loss.  She denies respiratory problems or chest pains.  No changes in bowel habits or abdominal pain.  No dysuria, hematuria or vaginal discharge.  On exam alert and conversant.  Oriented and in no distress.  No jaundice.  No palpable lymphadenopathy in the neck, supraclavicular regions or axillary spaces.  The lungs are clear and the heart regular.  The abdomen is protuberant but soft.  No palpable adenopathy in the inguinal regions.  No edema.  Reviewed the laboratory exams.  She persists with lymphocytosis.  The total white cell count is slightly greater today than at the time of the last visit, at 20,333/mm³.  No anemia or neutropenia.  No thrombocytopenia.  A recent breast ultrasound revealed and intramammary lymph node in the inferior portion of the right breast.  Axillary adenopathy was documented as well.  This was not changed as compared to before.  A decision has been made to continue to observe.  She is going to have another mammogram in approximately 6 months from now.  She will see me in 4 months with new laboratory exams.    Past Medical History:   Diagnosis Date    Allergic     Nickel, Cipro, Latex    Anxiety     Chest pain 2021    Disease of thyroid gland     Emphysema of lung 2022    Diagnosed from CT Scan.  Seeing a pulmonologist in May.    GERD (gastroesophageal reflux disease)     Hyperlipidemia     Hypertension     Insomnia     Myocardial infarction 10/06/1986    Sleep apnea Oct, 2022    Urinary tract infection     I’ve had many; doing better since I started supplements,     Past Surgical History:   Procedure Laterality Date    APPENDECTOMY      CARDIAC CATHETERIZATION       SECTION      CYST REMOVAL      FOOT ARTHROPLASTY      HYSTERECTOMY      REPLACEMENT TOTAL KNEE  01/10/2024       Current Outpatient Medications:     celecoxib (CeleBREX) 100 MG capsule, , Disp: , Rfl:     cholecalciferol (Vitamin D) 25 MCG (1000 UT) tablet, , Disp: , Rfl:     Cranberry 1000 MG  capsule, , Disp: , Rfl:     DULoxetine (CYMBALTA) 30 MG capsule, Take 2 capsules by mouth Daily., Disp: , Rfl:     eszopiclone (LUNESTA) 2 MG tablet, TAKE ONE (1) TABLET BY MOUTH EVERY NIGHT AT BEDTIME, Disp: , Rfl:     hydroxychloroquine (PLAQUENIL) 200 MG tablet, TAKE TWO (2) TABLETS BY MOUTH EVERY DAY, Disp: , Rfl:     losartan-hydrochlorothiazide (HYZAAR) 50-12.5 MG per tablet, , Disp: , Rfl:     pantoprazole (PROTONIX) 40 MG EC tablet, TAKE ONE (1) TABLET BY MOUTH EVERY DAY, Disp: , Rfl:     simvastatin (ZOCOR) 40 MG tablet, simvastatin 40 mg tablet  TAKE ONE (1) TABLET BY MOUTH EVERY DAY, Disp: , Rfl:     thiamine (B-1) 100 MG/ML injection, , Disp: , Rfl:     Allergies   Allergen Reactions    Nickel Hives    Latex Nausea Only    Ciprofloxacin Itching and Rash     Family History   Problem Relation Age of Onset    Heart disease Mother     Hypertension Mother     Leukemia Mother     Heart disease Father     Hypertension Father     Prostate cancer Father 74     Cancer-related family history includes Prostate cancer (age of onset: 74) in her father.    Social History     Tobacco Use    Smoking status: Never     Passive exposure: Never    Smokeless tobacco: Never   Vaping Use    Vaping status: Never Used   Substance Use Topics    Alcohol use: Yes     Comment: Maybe one craft beer a month.    Drug use: Never     Social History     Social History Narrative    Not on file     ROS:   Review of Systems   Constitutional:  Negative for activity change, appetite change, chills, diaphoresis, fatigue, fever and unexpected weight change.   HENT:  Negative for congestion, dental problem, drooling, ear discharge, ear pain, facial swelling, hearing loss, mouth sores, nosebleeds, postnasal drip, rhinorrhea, sinus pressure, sinus pain, sneezing, sore throat, tinnitus, trouble swallowing and voice change.    Eyes:  Negative for photophobia, pain, discharge, redness, itching and visual disturbance.   Respiratory:  Negative for apnea,  "cough, choking, chest tightness, shortness of breath, wheezing and stridor.    Cardiovascular:  Negative for chest pain, palpitations and leg swelling.   Gastrointestinal:  Negative for abdominal distention, abdominal pain, anal bleeding, blood in stool, constipation, diarrhea, nausea, rectal pain and vomiting.   Endocrine: Negative for cold intolerance, heat intolerance, polydipsia and polyuria.   Genitourinary:  Negative for decreased urine volume, difficulty urinating, dysuria, flank pain, frequency, genital sores, hematuria and urgency.   Musculoskeletal:  Negative for arthralgias, back pain, gait problem, joint swelling, myalgias, neck pain and neck stiffness.   Skin:  Negative for color change, pallor and rash.   Neurological:  Negative for dizziness, tremors, seizures, syncope, facial asymmetry, speech difficulty, weakness, light-headedness, numbness and headaches.   Hematological:  Negative for adenopathy. Does not bruise/bleed easily.   Psychiatric/Behavioral:  Negative for agitation, behavioral problems, confusion, decreased concentration, hallucinations, self-injury, sleep disturbance and suicidal ideas. The patient is not nervous/anxious.      Objective:    Vital Signs:  Vitals:    01/09/25 0849   BP: 137/82   Pulse: 84   Resp: 14   Temp: 98.2 °F (36.8 °C)   SpO2: 99%   Weight: 101 kg (222 lb)   Height: 170.2 cm (67\")   PainSc: 0-No pain     Body mass index is 34.77 kg/m².    ECOG  (0) Fully active, able to carry on all predisease performance without restriction    Physical Exam:   Physical Exam  Constitutional:       General: She is not in acute distress.     Appearance: She is not ill-appearing, toxic-appearing or diaphoretic.      Comments: Well-built and well-oriented woman who does not seem in distress.  She is conversant and oriented.  BMI greater than 34 kg/m².   HENT:      Head: Normocephalic and atraumatic.      Right Ear: External ear normal.      Left Ear: External ear normal.      Nose: Nose " normal.      Mouth/Throat:      Mouth: Mucous membranes are moist.      Pharynx: Oropharynx is clear. No oropharyngeal exudate or posterior oropharyngeal erythema.   Eyes:      General: No scleral icterus.        Right eye: No discharge.         Left eye: No discharge.      Conjunctiva/sclera: Conjunctivae normal.      Pupils: Pupils are equal, round, and reactive to light.   Cardiovascular:      Rate and Rhythm: Normal rate and regular rhythm.      Pulses: Normal pulses.      Heart sounds: No murmur heard.     No friction rub. No gallop.   Pulmonary:      Effort: No respiratory distress.      Breath sounds: No stridor. No wheezing, rhonchi or rales.   Abdominal:      General: Bowel sounds are normal. There is no distension.      Palpations: Abdomen is soft. There is no mass.      Tenderness: There is no abdominal tenderness. There is no right CVA tenderness, left CVA tenderness, guarding or rebound.      Hernia: No hernia is present.      Comments: Protuberant and rounded.  Soft and nontender.  No hepatomegaly or splenomegaly.   Musculoskeletal:         General: No tenderness, deformity or signs of injury.      Cervical back: No rigidity.      Right lower leg: No edema.      Left lower leg: No edema.   Lymphadenopathy:      Cervical: No cervical adenopathy.   Skin:     Coloration: Skin is not jaundiced.      Findings: No bruising, lesion or rash.   Neurological:      General: No focal deficit present.      Mental Status: She is alert and oriented to person, place, and time.      Cranial Nerves: No cranial nerve deficit.      Motor: No weakness.      Gait: Gait normal.   Psychiatric:         Mood and Affect: Mood normal.         Behavior: Behavior normal.         Thought Content: Thought content normal.         Judgment: Judgment normal.     LONG Chávez MD performed the physical exam on 1/9/2025 as documented above.    Lab Results - Last 18 Months   Lab Units 01/09/25  0848 10/18/24  1615 09/11/24  1134   WBC  10*3/mm3 20.33* 18.68* 17.37*   HEMOGLOBIN g/dL 13.9 12.7 13.6   HEMATOCRIT % 44.2 40.2 42.8   PLATELETS 10*3/mm3 242 244 230   MCV fL 91.3 90.7 92.2     Lab Results - Last 18 Months   Lab Units 10/18/24  1615 07/22/24  1120 02/07/24  0945   SODIUM mmol/L 140 140 141   POTASSIUM mmol/L 4.0 4.5 5.1   CHLORIDE mmol/L 104 104 104   CO2 mmol/L 28.6 27.6 30.0*   BUN mg/dL 21 17 20   CREATININE mg/dL 0.72 0.62 0.70   CALCIUM mg/dL 9.5 9.3 10.1   BILIRUBIN mg/dL  --   --  0.3   ALK PHOS U/L  --   --  70   ALT (SGPT) U/L  --   --  24   AST (SGOT) U/L  --   --  19   GLUCOSE mg/dL 79 83 89     Lab Results   Component Value Date    GLUCOSE 79 10/18/2024    BUN 21 10/18/2024    CREATININE 0.72 10/18/2024    EGFRIFNONA 90 12/17/2021    BCR 29.2 (H) 10/18/2024    K 4.0 10/18/2024    CO2 28.6 10/18/2024    CALCIUM 9.5 10/18/2024    ALBUMIN 4.3 10/18/2024    AST 19 02/07/2024    ALT 24 02/07/2024     Assessment & Plan     1.  Chronic lymphocytic leukemia stage 0.  Entirely asymptomatic.  To continue observation.  2.  Rheumatoid arthritis  3.  Reviewed all the laboratory exams and discussed the results with her.  Reviewed the records from the recent surgery and reviewed records from [facility].  Discussed results with her.  4.  She is to see me in approximately 4 months.    Marc Chávez MD on 1/9/2025 at 9:12 AM.

## 2025-01-09 ENCOUNTER — OFFICE VISIT (OUTPATIENT)
Dept: ONCOLOGY | Facility: CLINIC | Age: 60
End: 2025-01-09
Payer: COMMERCIAL

## 2025-01-09 ENCOUNTER — LAB (OUTPATIENT)
Dept: LAB | Facility: HOSPITAL | Age: 60
End: 2025-01-09
Payer: COMMERCIAL

## 2025-01-09 VITALS
OXYGEN SATURATION: 99 % | HEIGHT: 67 IN | RESPIRATION RATE: 14 BRPM | HEART RATE: 84 BPM | TEMPERATURE: 98.2 F | WEIGHT: 222 LBS | DIASTOLIC BLOOD PRESSURE: 82 MMHG | SYSTOLIC BLOOD PRESSURE: 137 MMHG | BODY MASS INDEX: 34.84 KG/M2

## 2025-01-09 DIAGNOSIS — C91.10 CLL (CHRONIC LYMPHOCYTIC LEUKEMIA): Primary | ICD-10-CM

## 2025-01-09 LAB
ALBUMIN SERPL-MCNC: 4.3 G/DL (ref 3.5–5.2)
ALBUMIN/GLOB SERPL: 1.8 G/DL
ALP SERPL-CCNC: 98 U/L (ref 39–117)
ALT SERPL W P-5'-P-CCNC: 20 U/L (ref 1–33)
ANION GAP SERPL CALCULATED.3IONS-SCNC: 7.9 MMOL/L (ref 5–15)
AST SERPL-CCNC: 18 U/L (ref 1–32)
BASOPHILS # BLD AUTO: 0.04 10*3/MM3 (ref 0–0.2)
BASOPHILS NFR BLD AUTO: 0.2 % (ref 0–1.5)
BILIRUB SERPL-MCNC: 0.3 MG/DL (ref 0–1.2)
BUN SERPL-MCNC: 25 MG/DL (ref 8–23)
BUN/CREAT SERPL: 41.7 (ref 7–25)
CALCIUM SPEC-SCNC: 10 MG/DL (ref 8.6–10.5)
CHLORIDE SERPL-SCNC: 104 MMOL/L (ref 98–107)
CO2 SERPL-SCNC: 30.1 MMOL/L (ref 22–29)
CREAT SERPL-MCNC: 0.6 MG/DL (ref 0.57–1)
DEPRECATED RDW RBC AUTO: 45.6 FL (ref 37–54)
EGFRCR SERPLBLD CKD-EPI 2021: 102.3 ML/MIN/1.73
EOSINOPHIL # BLD AUTO: 0.28 10*3/MM3 (ref 0–0.4)
EOSINOPHIL NFR BLD AUTO: 1.4 % (ref 0.3–6.2)
ERYTHROCYTE [DISTWIDTH] IN BLOOD BY AUTOMATED COUNT: 13.8 % (ref 12.3–15.4)
GLOBULIN UR ELPH-MCNC: 2.4 GM/DL
GLUCOSE SERPL-MCNC: 83 MG/DL (ref 65–99)
HCT VFR BLD AUTO: 44.2 % (ref 34–46.6)
HGB BLD-MCNC: 13.9 G/DL (ref 12–15.9)
HOLD SPECIMEN: NORMAL
LYMPHOCYTES # BLD AUTO: 15.31 10*3/MM3 (ref 0.7–3.1)
LYMPHOCYTES NFR BLD AUTO: 75.3 % (ref 19.6–45.3)
MCH RBC QN AUTO: 28.7 PG (ref 26.6–33)
MCHC RBC AUTO-ENTMCNC: 31.4 G/DL (ref 31.5–35.7)
MCV RBC AUTO: 91.3 FL (ref 79–97)
MONOCYTES # BLD AUTO: 1.19 10*3/MM3 (ref 0.1–0.9)
MONOCYTES NFR BLD AUTO: 5.9 % (ref 5–12)
NEUTROPHILS NFR BLD AUTO: 17.2 % (ref 42.7–76)
NEUTROPHILS NFR BLD AUTO: 3.51 10*3/MM3 (ref 1.7–7)
PLATELET # BLD AUTO: 242 10*3/MM3 (ref 140–450)
PMV BLD AUTO: 10.4 FL (ref 6–12)
POTASSIUM SERPL-SCNC: 4.3 MMOL/L (ref 3.5–5.2)
PROT SERPL-MCNC: 6.7 G/DL (ref 6–8.5)
RBC # BLD AUTO: 4.84 10*6/MM3 (ref 3.77–5.28)
SODIUM SERPL-SCNC: 142 MMOL/L (ref 136–145)
WBC NRBC COR # BLD AUTO: 20.33 10*3/MM3 (ref 3.4–10.8)

## 2025-01-09 PROCEDURE — 85025 COMPLETE CBC W/AUTO DIFF WBC: CPT

## 2025-01-09 PROCEDURE — 99213 OFFICE O/P EST LOW 20 MIN: CPT | Performed by: INTERNAL MEDICINE

## 2025-01-09 PROCEDURE — 36415 COLL VENOUS BLD VENIPUNCTURE: CPT

## 2025-01-09 PROCEDURE — 80053 COMPREHEN METABOLIC PANEL: CPT | Performed by: INTERNAL MEDICINE

## 2025-01-09 RX ORDER — LOSARTAN POTASSIUM AND HYDROCHLOROTHIAZIDE 12.5; 5 MG/1; MG/1
TABLET ORAL
COMMUNITY
Start: 2024-11-27

## 2025-01-09 RX ORDER — CELECOXIB 100 MG/1
CAPSULE ORAL
COMMUNITY
Start: 2024-11-23

## 2025-01-09 RX ORDER — CHOLECALCIFEROL (VITAMIN D3) 25 MCG
TABLET ORAL
COMMUNITY
Start: 2024-12-30

## 2025-01-09 RX ORDER — THIAMINE HYDROCHLORIDE 100 MG/ML
INJECTION, SOLUTION INTRAMUSCULAR; INTRAVENOUS
COMMUNITY
Start: 2024-12-30

## 2025-06-06 NOTE — PROGRESS NOTES
HEMATOLOGY ONCOLOGY OUTPATIENT FOLLOW-UP       Patient name: Tatyana Moseley  : 1965  MRN: [id number]  Primary Care Physician: Pietro Rahman NP  Referring Physician: No ref. provider found  Reason For Consult: Chronic lymphocytic leukemia.    History of Present Illness:    2024: In the office for the first time to transfer care.   first came to attention between  and  when she was found to have elevated white cell count with lymphocytosis on routine blood work.  Initially her white cell count was around 13,200/mm³ with 63% lymphocytes.  She did not have anemia or thrombocytopenia.  She carried a history of rheumatoid arthritis and intermittently was treated with steroids.  Eventually she underwent lumbar vertebral fusion in late .  Early in  she had a sample of blood analyzed by flow cytometry and again B-cell lymphocytosis, consistent with chronic lymphocytic leukemia, was reported.  Given her lack of symptoms and the lack of obvious progression of the condition observation was chosen.  She is in the office for initial evaluation reporting no symptoms.  She recently underwent total knee arthroplasty on the right and received steroids for this.  She is recovering well and has had no problems.  She has remained free of fevers, nocturnal diaphoresis or unintended weight loss.  She denies respiratory symptoms and has had no digestive problems.  She continues to experience arthralgia of multiple articulations.  On exam she is a well-built woman who appears chronically ill but does not seem in any distress.  She is neither jaundiced nor pale.  The lungs are clear and the heart is regular.  The abdomen is soft and nontender.  No edema.  The laboratory exams were reviewed.  No need for intervention at this point.  She is to have a FISH panel to determine prognosis on a sample of blood today and will see me to review the results in approximately 4  months.    5/10/2024: Entirely asymptomatic.  Continues to have some back pain.  Also has some lower abdominal pain.  Generally energetic and with good appetite.  No weight loss.  Has been afebrile and with occasional nocturnal diaphoresis.  No chest pains and no dyspnea.  On exam she appears chronically ill.  She is in no distress.  She is neither jaundiced nor pale.  The lungs are clear bilaterally and the heart is regular.  The abdomen is protuberant and rounded.  It is soft and nontender and no hepatomegaly or splenomegaly are identified.  The laboratory exams reveal a white blood cell count of 25,860/mm³ with large predominance of lymphocytes.  No neutropenia.  Hemoglobin and platelets are also within the normal range.  The FISH panel for prognostic did not reveal any major cytogenetic abnormalities and in particular it did not reveal adverse prognostic markers.  Will continue to monitor periodically and I have asked her to see me in approximately 4 months.  Discussed with her the findings.    9/11/2024: Feels well. Recently had a laminectomy and surgery to the left foot. Otherwise without problems. She has continued to have nocturnal diaphoresis but it is not worse than usual. No fevers. No unintended weight loss. Denies chest pain or cough. No abdominal pain or diarrhea and no dysuria. No edema. No skin rash. On exam alert and conversant. No distress and no jaundice. No oral lesions and no  palpable adenopathy. Lungs are clear and heart regular. Abdomen protuberant and soft; the liver and spleen not enlarged. No edema. The laboratory exams were reviewed and discussed with her and her spouse. To see me in 4 months.     1/9/2025: Feeling well.  Underwent hip arthroplasty on the right without any complications but with some difficulties recovering.  She had some edema of the right lower extremity following that which is now resolved.  She has been afebrile.  No nocturnal diaphoresis and no unintended weight  loss.  She denies respiratory problems or chest pains.  No changes in bowel habits or abdominal pain.  No dysuria, hematuria or vaginal discharge.  On exam alert and conversant.  Oriented and in no distress.  No jaundice.  No palpable lymphadenopathy in the neck, supraclavicular regions or axillary spaces.  The lungs are clear and the heart regular.  The abdomen is protuberant but soft.  No palpable adenopathy in the inguinal regions.  No edema.  Reviewed the laboratory exams.  She persists with lymphocytosis.  The total white cell count is slightly greater today than at the time of the last visit, at 20,333/mm³.  No anemia or neutropenia.  No thrombocytopenia.  A recent breast ultrasound revealed and intramammary lymph node in the inferior portion of the right breast.  Axillary adenopathy was documented as well.  This was not changed as compared to before.  A decision has been made to continue to observe.  She is going to have another mammogram in approximately 6 months from now.  She will see me in 4 months with new laboratory exams.    6/10/2025: Feeling reasonably well today. She does report continued discomfort in right axillary area.  She does have upcoming mammogram and ultrasound that will be done at [facility].  Her energy level has been good and she has had no new limitations.  She has been afebrile, denies nocturnal diaphoresis or unintended weight loss.  She denies chest pain or dyspnea.  Denies abdominal pain or changes in bowel habits.  On exam she is alert and conversant.  Appears in good spirits.  No distress.  No jaundice nor pallor noted.  No palpable lymphadenopathy noted in the neck, supraclavicular regions or axillary spaces.  Lungs clear and heart regular.  Abdomen protuberant but soft.  Reviewed laboratory exams and discussed with her.  The total white cell count is slightly greater than the last visit but no anemia, neutropenia or thrombocytopenia.    Past Medical History:   Diagnosis  Date    Allergic     Nickel, Cipro, Latex    Anxiety     Chest pain 2021    Disease of thyroid gland     Emphysema of lung 2022    Diagnosed from CT Scan.  Seeing a pulmonologist in May.    GERD (gastroesophageal reflux disease)     Hyperlipidemia     Hypertension     Insomnia     Myocardial infarction 10/06/1986    Sleep apnea Oct, 2022    Urinary tract infection     I’ve had many; doing better since I started supplements,     Past Surgical History:   Procedure Laterality Date    APPENDECTOMY      CARDIAC CATHETERIZATION       SECTION      CYST REMOVAL      FOOT ARTHROPLASTY      HYSTERECTOMY      REPLACEMENT TOTAL KNEE  01/10/2024    TOTAL HIP ARTHROPLASTY Right 10/30/2024       Current Outpatient Medications:     cholecalciferol (Vitamin D) 25 MCG (1000 UT) tablet, , Disp: , Rfl:     Cranberry 1000 MG capsule, , Disp: , Rfl:     DULoxetine (CYMBALTA) 60 MG capsule, , Disp: , Rfl:     eszopiclone (LUNESTA) 2 MG tablet, TAKE ONE (1) TABLET BY MOUTH EVERY NIGHT AT BEDTIME, Disp: , Rfl:     gabapentin (NEURONTIN) 300 MG capsule, , Disp: , Rfl:     hydroxychloroquine (PLAQUENIL) 200 MG tablet, TAKE TWO (2) TABLETS BY MOUTH EVERY DAY, Disp: , Rfl:     levothyroxine (SYNTHROID, LEVOTHROID) 137 MCG tablet, TAKE ONE (1) TABLET BY MOUTH EVERY DAY, Disp: , Rfl:     losartan-hydrochlorothiazide (HYZAAR) 50-12.5 MG per tablet, , Disp: , Rfl:     pantoprazole (PROTONIX) 20 MG EC tablet, , Disp: , Rfl:     simvastatin (ZOCOR) 40 MG tablet, simvastatin 40 mg tablet  TAKE ONE (1) TABLET BY MOUTH EVERY DAY, Disp: , Rfl:     celecoxib (CeleBREX) 100 MG capsule, , Disp: , Rfl:     Allergies   Allergen Reactions    Nickel Hives    Latex Nausea Only    Ciprofloxacin Itching and Rash    Keflex [Cephalexin] Hives     Family History   Problem Relation Age of Onset    Heart disease Mother     Hypertension Mother     Leukemia Mother     Heart disease Father     Hypertension Father     Prostate cancer Father 74  "    Cancer-related family history includes Prostate cancer (age of onset: 74) in her father.    Social History     Tobacco Use    Smoking status: Never     Passive exposure: Never    Smokeless tobacco: Never   Vaping Use    Vaping status: Never Used   Substance Use Topics    Alcohol use: Yes     Comment: Maybe one craft beer a month.    Drug use: Never     Social History     Social History Narrative    Not on file     ROS:   Review of Systems   Constitutional:  Negative for activity change, appetite change, chills, diaphoresis, fatigue, fever and unexpected weight change.   HENT:  Negative for congestion, dental problem, ear pain, mouth sores, nosebleeds and sore throat.    Eyes:  Negative for photophobia and visual disturbance.   Respiratory:  Negative for cough, choking, chest tightness, shortness of breath, wheezing and stridor.    Cardiovascular:  Negative for chest pain, palpitations and leg swelling.   Gastrointestinal:  Negative for abdominal pain, diarrhea, nausea and vomiting.   Endocrine: Negative for cold intolerance and heat intolerance.   Genitourinary:  Negative for dysuria, frequency and hematuria.   Musculoskeletal:  Negative for joint swelling and neck stiffness.   Skin:  Negative for color change, pallor, rash and wound.   Neurological:  Negative for seizures, syncope, weakness, light-headedness, numbness and headaches.   Hematological:  Negative for adenopathy. Does not bruise/bleed easily.        No obvious bleeding   Psychiatric/Behavioral:  Negative for agitation, confusion and hallucinations.      Objective:    Vital Signs:  Vitals:    06/10/25 0832   BP: 121/81   Pulse: 68   Temp: 98.1 °F (36.7 °C)   TempSrc: Oral   SpO2: 99%   Weight: 99 kg (218 lb 3.2 oz)   Height: 170.2 cm (67.01\")   PainSc: 6    PainLoc: Leg  Comment: both       Body mass index is 34.17 kg/m².    ECOG  (0) Fully active, able to carry on all predisease performance without restriction    Physical Exam:   Physical " Exam  Vitals reviewed.   Constitutional:       General: She is not in acute distress.     Appearance: She is not ill-appearing, toxic-appearing or diaphoretic.      Comments: Well-built and well-oriented woman who does not seem in distress.  She is conversant and oriented.  BMI greater than 34 kg/m².   HENT:      Head: Normocephalic and atraumatic.      Right Ear: External ear normal.      Left Ear: External ear normal.      Nose: Nose normal.   Eyes:      General: No scleral icterus.        Right eye: No discharge.         Left eye: No discharge.      Conjunctiva/sclera: Conjunctivae normal.   Cardiovascular:      Rate and Rhythm: Normal rate and regular rhythm.      Pulses: Normal pulses.      Heart sounds: No murmur heard.     No friction rub. No gallop.   Pulmonary:      Effort: No respiratory distress.      Breath sounds: No stridor. No wheezing, rhonchi or rales.   Abdominal:      General: Bowel sounds are normal. There is no distension.      Palpations: Abdomen is soft. There is no mass.      Tenderness: There is no abdominal tenderness. There is no right CVA tenderness, left CVA tenderness, guarding or rebound.      Hernia: No hernia is present.      Comments: Protuberant and rounded.  Soft and nontender.  No hepatomegaly or splenomegaly.   Musculoskeletal:         General: No swelling, tenderness, deformity or signs of injury.      Cervical back: No rigidity.      Right lower leg: No edema.      Left lower leg: No edema.   Lymphadenopathy:      Cervical: No cervical adenopathy.   Skin:     Coloration: Skin is not jaundiced or pale.      Findings: No bruising, erythema, lesion or rash.   Neurological:      General: No focal deficit present.      Mental Status: She is alert and oriented to person, place, and time.      Cranial Nerves: No cranial nerve deficit.      Motor: No weakness.      Gait: Gait normal.   Psychiatric:         Mood and Affect: Mood normal.         Behavior: Behavior normal.          Thought Content: Thought content normal.         Judgment: Judgment normal.         Lab Results - Last 18 Months   Lab Units 06/10/25  0839 01/09/25  0848 10/18/24  1615   WBC 10*3/mm3 22.12* 20.33* 18.68*   HEMOGLOBIN g/dL 13.3 13.9 12.7   HEMATOCRIT % 41.4 44.2 40.2   PLATELETS 10*3/mm3 199 242 244   MCV fL 92.0 91.3 90.7     Lab Results - Last 18 Months   Lab Units 06/10/25  0839 01/09/25  0848 10/18/24  1615 07/22/24  1120 02/07/24  0945   SODIUM mmol/L 142 142 140   < > 141   POTASSIUM mmol/L 4.2 4.3 4.0   < > 5.1   CHLORIDE mmol/L 105 104 104   < > 104   CO2 mmol/L 29.0 30.1* 28.6   < > 30.0*   BUN mg/dL 19.2 25* 21   < > 20   CREATININE mg/dL 0.68 0.60 0.72   < > 0.70   CALCIUM mg/dL 9.4 10.0 9.5   < > 10.1   BILIRUBIN mg/dL 0.3 0.3  --   --  0.3   ALK PHOS U/L 83 98  --   --  70   ALT (SGPT) U/L 19 20  --   --  24   AST (SGOT) U/L 19 18  --   --  19   GLUCOSE mg/dL 81 83 79   < > 89    < > = values in this interval not displayed.     Lab Results   Component Value Date    GLUCOSE 81 06/10/2025    BUN 19.2 06/10/2025    CREATININE 0.68 06/10/2025    EGFRIFNONA 90 12/17/2021    BCR 28.2 (H) 06/10/2025    K 4.2 06/10/2025    CO2 29.0 06/10/2025    CALCIUM 9.4 06/10/2025    ALBUMIN 4.2 06/10/2025    AST 19 06/10/2025    ALT 19 06/10/2025     Assessment & Plan     1.  Chronic lymphocytic leukemia stage 0.  Remains asymptomatic.  Will continue with observation.  Has upcoming mammogram and ultrasound.  2.  Rheumatoid arthritis  3.  Reviewed laboratory exams and recent records.  Discussed with her  4.  Follow-up with Dr. Chávez in 4 months, sooner if condition indicates    Electronically signed by Geneva Wayne PA-C

## 2025-06-10 ENCOUNTER — TELEPHONE (OUTPATIENT)
Dept: ONCOLOGY | Facility: CLINIC | Age: 60
End: 2025-06-10

## 2025-06-10 ENCOUNTER — LAB (OUTPATIENT)
Dept: LAB | Facility: HOSPITAL | Age: 60
End: 2025-06-10
Payer: COMMERCIAL

## 2025-06-10 ENCOUNTER — OFFICE VISIT (OUTPATIENT)
Dept: ONCOLOGY | Facility: CLINIC | Age: 60
End: 2025-06-10
Payer: COMMERCIAL

## 2025-06-10 VITALS
OXYGEN SATURATION: 99 % | HEART RATE: 68 BPM | SYSTOLIC BLOOD PRESSURE: 121 MMHG | BODY MASS INDEX: 34.25 KG/M2 | HEIGHT: 67 IN | DIASTOLIC BLOOD PRESSURE: 81 MMHG | WEIGHT: 218.2 LBS | TEMPERATURE: 98.1 F

## 2025-06-10 DIAGNOSIS — C91.10 CLL (CHRONIC LYMPHOCYTIC LEUKEMIA): Primary | ICD-10-CM

## 2025-06-10 DIAGNOSIS — R92.8 ABNORMAL FINDINGS ON DIAGNOSTIC IMAGING OF BREAST: ICD-10-CM

## 2025-06-10 LAB
ALBUMIN SERPL-MCNC: 4.2 G/DL (ref 3.5–5.2)
ALBUMIN/GLOB SERPL: 1.9 G/DL
ALP SERPL-CCNC: 83 U/L (ref 39–117)
ALT SERPL W P-5'-P-CCNC: 19 U/L (ref 1–33)
ANION GAP SERPL CALCULATED.3IONS-SCNC: 8 MMOL/L (ref 5–15)
AST SERPL-CCNC: 19 U/L (ref 1–32)
BASOPHILS # BLD AUTO: 0.02 10*3/MM3 (ref 0–0.2)
BASOPHILS NFR BLD AUTO: 0.1 % (ref 0–1.5)
BILIRUB SERPL-MCNC: 0.3 MG/DL (ref 0–1.2)
BUN SERPL-MCNC: 19.2 MG/DL (ref 8–23)
BUN/CREAT SERPL: 28.2 (ref 7–25)
CALCIUM SPEC-SCNC: 9.4 MG/DL (ref 8.6–10.5)
CHLORIDE SERPL-SCNC: 105 MMOL/L (ref 98–107)
CO2 SERPL-SCNC: 29 MMOL/L (ref 22–29)
CREAT SERPL-MCNC: 0.68 MG/DL (ref 0.57–1)
DEPRECATED RDW RBC AUTO: 48 FL (ref 37–54)
EGFRCR SERPLBLD CKD-EPI 2021: 98.6 ML/MIN/1.73
EOSINOPHIL # BLD AUTO: 0.1 10*3/MM3 (ref 0–0.4)
EOSINOPHIL NFR BLD AUTO: 0.5 % (ref 0.3–6.2)
ERYTHROCYTE [DISTWIDTH] IN BLOOD BY AUTOMATED COUNT: 14.5 % (ref 12.3–15.4)
GLOBULIN UR ELPH-MCNC: 2.2 GM/DL
GLUCOSE SERPL-MCNC: 81 MG/DL (ref 65–99)
HCT VFR BLD AUTO: 41.4 % (ref 34–46.6)
HGB BLD-MCNC: 13.3 G/DL (ref 12–15.9)
HOLD SPECIMEN: NORMAL
LYMPHOCYTES # BLD AUTO: 17.64 10*3/MM3 (ref 0.7–3.1)
LYMPHOCYTES NFR BLD AUTO: 79.7 % (ref 19.6–45.3)
MCH RBC QN AUTO: 29.6 PG (ref 26.6–33)
MCHC RBC AUTO-ENTMCNC: 32.1 G/DL (ref 31.5–35.7)
MCV RBC AUTO: 92 FL (ref 79–97)
MONOCYTES # BLD AUTO: 0.89 10*3/MM3 (ref 0.1–0.9)
MONOCYTES NFR BLD AUTO: 4 % (ref 5–12)
NEUTROPHILS NFR BLD AUTO: 15.7 % (ref 42.7–76)
NEUTROPHILS NFR BLD AUTO: 3.47 10*3/MM3 (ref 1.7–7)
PLATELET # BLD AUTO: 199 10*3/MM3 (ref 140–450)
PMV BLD AUTO: 10 FL (ref 6–12)
POTASSIUM SERPL-SCNC: 4.2 MMOL/L (ref 3.5–5.2)
PROT SERPL-MCNC: 6.4 G/DL (ref 6–8.5)
RBC # BLD AUTO: 4.5 10*6/MM3 (ref 3.77–5.28)
SODIUM SERPL-SCNC: 142 MMOL/L (ref 136–145)
WBC NRBC COR # BLD AUTO: 22.12 10*3/MM3 (ref 3.4–10.8)

## 2025-06-10 PROCEDURE — 99214 OFFICE O/P EST MOD 30 MIN: CPT | Performed by: PHYSICIAN ASSISTANT

## 2025-06-10 PROCEDURE — 85025 COMPLETE CBC W/AUTO DIFF WBC: CPT

## 2025-06-10 PROCEDURE — 36415 COLL VENOUS BLD VENIPUNCTURE: CPT

## 2025-06-10 PROCEDURE — 80053 COMPREHEN METABOLIC PANEL: CPT | Performed by: INTERNAL MEDICINE

## 2025-06-10 RX ORDER — GABAPENTIN 300 MG/1
CAPSULE ORAL
COMMUNITY

## 2025-06-10 RX ORDER — DULOXETIN HYDROCHLORIDE 60 MG/1
CAPSULE, DELAYED RELEASE ORAL
COMMUNITY
Start: 2025-04-17

## 2025-06-10 RX ORDER — ESCITALOPRAM OXALATE 20 MG/1
TABLET ORAL
COMMUNITY
End: 2025-06-10

## 2025-06-10 RX ORDER — PANTOPRAZOLE SODIUM 20 MG/1
TABLET, DELAYED RELEASE ORAL
COMMUNITY
Start: 2025-05-16

## 2025-06-10 RX ORDER — LEVOTHYROXINE SODIUM 137 UG/1
TABLET ORAL
COMMUNITY

## 2025-06-10 NOTE — TELEPHONE ENCOUNTER
Provider: marcel    Caller: lois robin    Relationship to Patient: self    Phone Number: [phone]    Reason for Call: pt rec'd letter in the mail for mammogram, & has an appt @ [facility]. she's needing the appropriate orders sent to [facility] radiology's attn     When was the patient last seen: 06/10/2025

## 2025-06-27 DIAGNOSIS — R92.8 ABNORMAL FINDINGS ON DIAGNOSTIC IMAGING OF BREAST: Primary | ICD-10-CM

## 2025-07-01 ENCOUNTER — RESULTS FOLLOW-UP (OUTPATIENT)
Dept: ONCOLOGY | Facility: CLINIC | Age: 60
End: 2025-07-01
Payer: COMMERCIAL

## 2025-07-01 DIAGNOSIS — R92.8 ABNORMAL MAMMOGRAM OF RIGHT BREAST: ICD-10-CM

## 2025-07-01 DIAGNOSIS — R59.0 AXILLARY LYMPHADENOPATHY: Primary | ICD-10-CM

## 2025-07-02 ENCOUNTER — TELEPHONE (OUTPATIENT)
Dept: ONCOLOGY | Facility: CLINIC | Age: 60
End: 2025-07-02

## 2025-07-02 NOTE — TELEPHONE ENCOUNTER
ATTEMPTED TO CONTACT THE PATIENT PER EDIE FERNANDEZ TO DISCUSS HER MAMMOGRAM FINDINGS AS WELL AS TO DISCUSS THE NEED FOR A BIOPSY. NO ANSWER. [employer] LEFT REQUESTING A CALLBACK. CALLBACK NUMBER STATED ON [employer].

## 2025-07-02 NOTE — TELEPHONE ENCOUNTER
Caller: Tatyana Moseley    Relationship to patient: Self    Best call back number: [phone]    Chief complaint: SCHEDULING     Type of visit: FOLLOW UP    Requested date: ASAP     If rescheduling, when is the original appointment: 10-10     Additional notes: PT HAD A MAMMOGRAM AND US ON 6-30 AND IS CONCERNED WITH HER RESULTS WANTING TO MOVE HER APPT UP FROM 10-10    PLEASE ADVISE

## 2025-07-09 ENCOUNTER — HOSPITAL ENCOUNTER (OUTPATIENT)
Dept: ULTRASOUND IMAGING | Facility: HOSPITAL | Age: 60
Discharge: HOME | End: 2025-07-09
Payer: COMMERCIAL

## 2025-07-09 ENCOUNTER — HOSPITAL ENCOUNTER (OUTPATIENT)
Dept: MAMMOGRAPHY | Facility: HOSPITAL | Age: 60
Discharge: HOME | End: 2025-07-09
Payer: COMMERCIAL

## 2025-07-09 DIAGNOSIS — R92.8 ABNORMAL FINDINGS ON DIAGNOSTIC IMAGING OF BREAST: ICD-10-CM

## 2025-07-09 PROCEDURE — 25010000002 LIDOCAINE-EPINEPHRINE 2 %-1:100000 SOLUTION: Performed by: PHYSICIAN ASSISTANT

## 2025-07-09 PROCEDURE — A4648 IMPLANTABLE TISSUE MARKER: HCPCS

## 2025-07-09 PROCEDURE — 88305 TISSUE EXAM BY PATHOLOGIST: CPT | Performed by: PHYSICIAN ASSISTANT

## 2025-07-09 PROCEDURE — 25010000002 LIDOCAINE 1 % SOLUTION: Performed by: PHYSICIAN ASSISTANT

## 2025-07-09 RX ORDER — LIDOCAINE HYDROCHLORIDE 10 MG/ML
10 INJECTION, SOLUTION INFILTRATION; PERINEURAL ONCE
Status: COMPLETED | OUTPATIENT
Start: 2025-07-09 | End: 2025-07-09

## 2025-07-09 RX ORDER — LIDOCAINE HYDROCHLORIDE AND EPINEPHRINE BITARTRATE 20; .01 MG/ML; MG/ML
10 INJECTION, SOLUTION SUBCUTANEOUS ONCE
Status: COMPLETED | OUTPATIENT
Start: 2025-07-09 | End: 2025-07-09

## 2025-07-09 RX ADMIN — LIDOCAINE HYDROCHLORIDE 5 ML: 10 INJECTION, SOLUTION INFILTRATION; PERINEURAL at 12:46

## 2025-07-09 RX ADMIN — LIDOCAINE HYDROCHLORIDE AND EPINEPHRINE 8 ML: 20; 10 INJECTION, SOLUTION INFILTRATION; PERINEURAL at 12:46

## 2025-07-10 ENCOUNTER — TELEPHONE (OUTPATIENT)
Dept: MAMMOGRAPHY | Facility: HOSPITAL | Age: 60
End: 2025-07-10
Payer: COMMERCIAL

## 2025-07-10 LAB — Lab: NORMAL

## 2025-07-11 ENCOUNTER — TELEPHONE (OUTPATIENT)
Dept: ONCOLOGY | Facility: CLINIC | Age: 60
End: 2025-07-11
Payer: COMMERCIAL

## 2025-07-11 NOTE — TELEPHONE ENCOUNTER
Caller: Tatyana Moseley    Relationship: Self    Best call back number:   Telephone Information:   Mobile [phone]       What test was performed: ULTRASOUND GUIDED BIOPSY     When was the test performed: 7/9/2025    Where was the test performed: [facility]    Additional notes: CALL TO GO OVER BIOPSY RESULTS

## 2025-07-11 NOTE — TELEPHONE ENCOUNTER
Contacted the patient and informed her that Dr. Chávez is currently out of the office until Monday therefore I will review the results with him at that time then I will call her back. She v/u and asked if an appt will be set up once the results are reviewed. I confirmed. I reiterated that I will contact her back on Monday to discuss the results. She v/u.

## 2025-07-12 LAB — CLL TARGETGENE PANEL RESULT: NORMAL

## 2025-07-13 LAB — CCV RESULT: NORMAL

## 2025-07-14 LAB
LAB AP CASE REPORT: NORMAL
LAB AP DIAGNOSIS COMMENT: NORMAL
LAB AP FISH HER2/NEU REPORT,ADDENDUM: NORMAL
LAB AP FLOW CYTOMETRY SUMMARY: NORMAL
PATH REPORT.FINAL DX SPEC: NORMAL
PATH REPORT.GROSS SPEC: NORMAL

## 2025-07-14 NOTE — TELEPHONE ENCOUNTER
Caller: Tatyana Moseley    Relationship: Self    Best call back number: [phone]    What is the best time to reach you: ANY    Who are you requesting to speak with (clinical staff, provider,  specific staff member): CLINICAL    What was the call regarding: PT IS JUST ANXIOUS TO GET RESULTS    Is it okay if the provider responds through MyChart: NO

## 2025-07-14 NOTE — TELEPHONE ENCOUNTER
After reviewing the biopsy results with Dr. Chávez, I contacted the patient. I informed her per Dr. Chávez, the findings on the biopsy are consistent with her CLL diagnosis and no breast malignancy was found. She v/u. She verified that her current follow-up appt can remain as is and does not need to be moved forward; I confirmed. I advised for her to contact the office if she has any further questions, needs or concerns. She confirmed.

## 2025-07-28 ENCOUNTER — TELEPHONE (OUTPATIENT)
Dept: ONCOLOGY | Facility: CLINIC | Age: 60
End: 2025-07-28

## 2025-08-05 ENCOUNTER — OFFICE VISIT (OUTPATIENT)
Dept: ONCOLOGY | Facility: CLINIC | Age: 60
End: 2025-08-05
Payer: COMMERCIAL

## 2025-08-05 ENCOUNTER — LAB (OUTPATIENT)
Dept: LAB | Facility: HOSPITAL | Age: 60
End: 2025-08-05
Payer: COMMERCIAL

## 2025-08-05 VITALS
HEART RATE: 68 BPM | SYSTOLIC BLOOD PRESSURE: 119 MMHG | BODY MASS INDEX: 34.47 KG/M2 | WEIGHT: 219.6 LBS | TEMPERATURE: 98.2 F | OXYGEN SATURATION: 97 % | HEIGHT: 67 IN | DIASTOLIC BLOOD PRESSURE: 68 MMHG

## 2025-08-05 DIAGNOSIS — R53.83 OTHER FATIGUE: ICD-10-CM

## 2025-08-05 DIAGNOSIS — C91.10 CLL (CHRONIC LYMPHOCYTIC LEUKEMIA): Primary | ICD-10-CM

## 2025-08-05 DIAGNOSIS — R59.0 AXILLARY LYMPHADENOPATHY: ICD-10-CM

## 2025-08-05 LAB
ALBUMIN SERPL-MCNC: 4.1 G/DL (ref 3.5–5.2)
ALBUMIN/GLOB SERPL: 2.1 G/DL
ALP SERPL-CCNC: 76 U/L (ref 39–117)
ALT SERPL W P-5'-P-CCNC: 18 U/L (ref 1–33)
ANION GAP SERPL CALCULATED.3IONS-SCNC: 10.6 MMOL/L (ref 5–15)
AST SERPL-CCNC: 16 U/L (ref 1–32)
BASOPHILS # BLD AUTO: 0.05 10*3/MM3 (ref 0–0.2)
BASOPHILS NFR BLD AUTO: 0.2 % (ref 0–1.5)
BILIRUB SERPL-MCNC: 0.3 MG/DL (ref 0–1.2)
BUN SERPL-MCNC: 20.8 MG/DL (ref 8–23)
BUN/CREAT SERPL: 28.5 (ref 7–25)
CALCIUM SPEC-SCNC: 9.5 MG/DL (ref 8.6–10.5)
CHLORIDE SERPL-SCNC: 104 MMOL/L (ref 98–107)
CO2 SERPL-SCNC: 28.4 MMOL/L (ref 22–29)
CREAT SERPL-MCNC: 0.73 MG/DL (ref 0.57–1)
DEPRECATED RDW RBC AUTO: 44.6 FL (ref 37–54)
EGFRCR SERPLBLD CKD-EPI 2021: 93.1 ML/MIN/1.73
EOSINOPHIL # BLD AUTO: 0.15 10*3/MM3 (ref 0–0.4)
EOSINOPHIL NFR BLD AUTO: 0.6 % (ref 0.3–6.2)
ERYTHROCYTE [DISTWIDTH] IN BLOOD BY AUTOMATED COUNT: 13.5 % (ref 12.3–15.4)
GLOBULIN UR ELPH-MCNC: 2 GM/DL
GLUCOSE SERPL-MCNC: 87 MG/DL (ref 65–99)
HCT VFR BLD AUTO: 39.6 % (ref 34–46.6)
HGB BLD-MCNC: 12.9 G/DL (ref 12–15.9)
HOLD SPECIMEN: NORMAL
IMM GRANULOCYTES # BLD AUTO: 0.02 10*3/MM3 (ref 0–0.05)
IMM GRANULOCYTES NFR BLD AUTO: 0.1 % (ref 0–0.5)
LYMPHOCYTES # BLD AUTO: 19.85 10*3/MM3 (ref 0.7–3.1)
LYMPHOCYTES NFR BLD AUTO: 83.8 % (ref 19.6–45.3)
MCH RBC QN AUTO: 29.2 PG (ref 26.6–33)
MCHC RBC AUTO-ENTMCNC: 32.6 G/DL (ref 31.5–35.7)
MCV RBC AUTO: 89.6 FL (ref 79–97)
MONOCYTES # BLD AUTO: 0.64 10*3/MM3 (ref 0.1–0.9)
MONOCYTES NFR BLD AUTO: 2.7 % (ref 5–12)
NEUTROPHILS NFR BLD AUTO: 12.6 % (ref 42.7–76)
NEUTROPHILS NFR BLD AUTO: 2.97 10*3/MM3 (ref 1.7–7)
PLATELET # BLD AUTO: 221 10*3/MM3 (ref 140–450)
PMV BLD AUTO: 9.5 FL (ref 6–12)
POTASSIUM SERPL-SCNC: 3.9 MMOL/L (ref 3.5–5.2)
PROT SERPL-MCNC: 6.1 G/DL (ref 6–8.5)
RBC # BLD AUTO: 4.42 10*6/MM3 (ref 3.77–5.28)
SODIUM SERPL-SCNC: 143 MMOL/L (ref 136–145)
T4 FREE SERPL-MCNC: 1.2 NG/DL (ref 0.92–1.68)
TSH SERPL DL<=0.05 MIU/L-ACNC: 0.79 UIU/ML (ref 0.27–4.2)
WBC NRBC COR # BLD AUTO: 23.68 10*3/MM3 (ref 3.4–10.8)

## 2025-08-05 PROCEDURE — 36415 COLL VENOUS BLD VENIPUNCTURE: CPT

## 2025-08-05 PROCEDURE — 99214 OFFICE O/P EST MOD 30 MIN: CPT | Performed by: PHYSICIAN ASSISTANT

## 2025-08-05 PROCEDURE — 80053 COMPREHEN METABOLIC PANEL: CPT | Performed by: PHYSICIAN ASSISTANT

## 2025-08-05 PROCEDURE — 85025 COMPLETE CBC W/AUTO DIFF WBC: CPT

## 2025-08-05 PROCEDURE — 84443 ASSAY THYROID STIM HORMONE: CPT | Performed by: PHYSICIAN ASSISTANT

## 2025-08-05 PROCEDURE — 84439 ASSAY OF FREE THYROXINE: CPT | Performed by: PHYSICIAN ASSISTANT

## 2025-08-28 ENCOUNTER — OFFICE VISIT (OUTPATIENT)
Dept: ONCOLOGY | Facility: CLINIC | Age: 60
End: 2025-08-28
Payer: COMMERCIAL

## 2025-08-28 ENCOUNTER — LAB (OUTPATIENT)
Dept: LAB | Facility: HOSPITAL | Age: 60
End: 2025-08-28
Payer: COMMERCIAL

## 2025-08-28 VITALS
HEART RATE: 67 BPM | HEIGHT: 67 IN | TEMPERATURE: 97.7 F | WEIGHT: 220.4 LBS | RESPIRATION RATE: 18 BRPM | OXYGEN SATURATION: 99 % | BODY MASS INDEX: 34.59 KG/M2 | SYSTOLIC BLOOD PRESSURE: 133 MMHG | DIASTOLIC BLOOD PRESSURE: 80 MMHG

## 2025-08-28 DIAGNOSIS — C91.10 CLL (CHRONIC LYMPHOCYTIC LEUKEMIA): Primary | ICD-10-CM

## 2025-08-28 LAB
ALBUMIN SERPL-MCNC: 4.5 G/DL (ref 3.5–5.2)
ALBUMIN/GLOB SERPL: 3 G/DL
ALP SERPL-CCNC: 78 U/L (ref 39–117)
ALT SERPL W P-5'-P-CCNC: 27 U/L (ref 1–33)
ANION GAP SERPL CALCULATED.3IONS-SCNC: 8.9 MMOL/L (ref 5–15)
AST SERPL-CCNC: 21 U/L (ref 1–32)
BASOPHILS # BLD AUTO: 0.02 10*3/MM3 (ref 0–0.2)
BASOPHILS NFR BLD AUTO: 0.1 % (ref 0–1.5)
BILIRUB SERPL-MCNC: 0.3 MG/DL (ref 0–1.2)
BUN SERPL-MCNC: 20.9 MG/DL (ref 8–23)
BUN/CREAT SERPL: 31.2 (ref 7–25)
CALCIUM SPEC-SCNC: 9.8 MG/DL (ref 8.6–10.5)
CHLORIDE SERPL-SCNC: 103 MMOL/L (ref 98–107)
CO2 SERPL-SCNC: 29.1 MMOL/L (ref 22–29)
CREAT SERPL-MCNC: 0.67 MG/DL (ref 0.57–1)
DEPRECATED RDW RBC AUTO: 47 FL (ref 37–54)
EGFRCR SERPLBLD CKD-EPI 2021: 99 ML/MIN/1.73
EOSINOPHIL # BLD AUTO: 0.12 10*3/MM3 (ref 0–0.4)
EOSINOPHIL NFR BLD AUTO: 0.5 % (ref 0.3–6.2)
ERYTHROCYTE [DISTWIDTH] IN BLOOD BY AUTOMATED COUNT: 13.9 % (ref 12.3–15.4)
GLOBULIN UR ELPH-MCNC: 1.5 GM/DL
GLUCOSE SERPL-MCNC: 86 MG/DL (ref 65–99)
HCT VFR BLD AUTO: 41.5 % (ref 34–46.6)
HGB BLD-MCNC: 13.4 G/DL (ref 12–15.9)
HOLD SPECIMEN: NORMAL
HOLD SPECIMEN: NORMAL
IMM GRANULOCYTES # BLD AUTO: 0.02 10*3/MM3 (ref 0–0.05)
IMM GRANULOCYTES NFR BLD AUTO: 0.1 % (ref 0–0.5)
LDH SERPL-CCNC: 172 U/L (ref 135–214)
LYMPHOCYTES # BLD AUTO: 18.32 10*3/MM3 (ref 0.7–3.1)
LYMPHOCYTES NFR BLD AUTO: 81.2 % (ref 19.6–45.3)
MCH RBC QN AUTO: 29.3 PG (ref 26.6–33)
MCHC RBC AUTO-ENTMCNC: 32.3 G/DL (ref 31.5–35.7)
MCV RBC AUTO: 90.6 FL (ref 79–97)
MONOCYTES # BLD AUTO: 0.65 10*3/MM3 (ref 0.1–0.9)
MONOCYTES NFR BLD AUTO: 2.9 % (ref 5–12)
NEUTROPHILS NFR BLD AUTO: 15.2 % (ref 42.7–76)
NEUTROPHILS NFR BLD AUTO: 3.42 10*3/MM3 (ref 1.7–7)
PHOSPHATE SERPL-MCNC: 4 MG/DL (ref 2.5–4.5)
PLATELET # BLD AUTO: 207 10*3/MM3 (ref 140–450)
PMV BLD AUTO: 9.8 FL (ref 6–12)
POTASSIUM SERPL-SCNC: 4.3 MMOL/L (ref 3.5–5.2)
PROT SERPL-MCNC: 6 G/DL (ref 6–8.5)
RBC # BLD AUTO: 4.58 10*6/MM3 (ref 3.77–5.28)
SODIUM SERPL-SCNC: 141 MMOL/L (ref 136–145)
URATE SERPL-MCNC: 5.8 MG/DL (ref 2.4–5.7)
WBC NRBC COR # BLD AUTO: 22.55 10*3/MM3 (ref 3.4–10.8)

## 2025-08-28 PROCEDURE — 80053 COMPREHEN METABOLIC PANEL: CPT | Performed by: INTERNAL MEDICINE

## 2025-08-28 PROCEDURE — 36415 COLL VENOUS BLD VENIPUNCTURE: CPT

## 2025-08-28 PROCEDURE — 83615 LACTATE (LD) (LDH) ENZYME: CPT | Performed by: INTERNAL MEDICINE

## 2025-08-28 PROCEDURE — 84550 ASSAY OF BLOOD/URIC ACID: CPT | Performed by: INTERNAL MEDICINE

## 2025-08-28 PROCEDURE — 84100 ASSAY OF PHOSPHORUS: CPT | Performed by: INTERNAL MEDICINE

## 2025-08-28 PROCEDURE — 85025 COMPLETE CBC W/AUTO DIFF WBC: CPT | Performed by: INTERNAL MEDICINE
